# Patient Record
Sex: MALE | Race: WHITE | NOT HISPANIC OR LATINO | Employment: FULL TIME | URBAN - METROPOLITAN AREA
[De-identification: names, ages, dates, MRNs, and addresses within clinical notes are randomized per-mention and may not be internally consistent; named-entity substitution may affect disease eponyms.]

---

## 2023-08-08 PROBLEM — M25.50 POLYARTHRALGIA: Status: ACTIVE | Noted: 2023-08-08

## 2023-08-08 PROBLEM — J30.2 SEASONAL ALLERGIES: Status: ACTIVE | Noted: 2023-08-08

## 2023-08-08 PROBLEM — R53.83 FATIGUE: Status: ACTIVE | Noted: 2023-08-08

## 2023-08-08 PROBLEM — H92.13 OTORRHEA OF BOTH EARS: Status: ACTIVE | Noted: 2023-08-08

## 2023-08-08 PROBLEM — R12 HEARTBURN: Status: ACTIVE | Noted: 2023-08-08

## 2023-08-08 PROBLEM — E78.5 HLD (HYPERLIPIDEMIA): Status: ACTIVE | Noted: 2023-08-08

## 2023-08-08 PROBLEM — E55.9 VITAMIN D DEFICIENCY: Status: ACTIVE | Noted: 2023-08-08

## 2023-08-08 PROBLEM — R35.0 FREQUENT URINATION: Status: ACTIVE | Noted: 2023-08-08

## 2023-08-08 PROBLEM — R73.09 ELEVATED GLUCOSE: Status: ACTIVE | Noted: 2023-08-08

## 2023-08-08 RX ORDER — HYDROGEN PEROXIDE 3 %
SOLUTION, NON-ORAL MISCELLANEOUS
COMMUNITY
Start: 2019-09-12 | End: 2023-08-09 | Stop reason: ALTCHOICE

## 2023-08-08 NOTE — PROGRESS NOTES
"Subjective   Patient ID: Junior Parmar is a 53 y.o. male who presents for Back Pain and Annual Exam.    HPI    Patient presents today for annual exam and HLD follow up.  He does try to stick to low sodium, low cholesterol, low sugar diet.  Does/not exercise. Denies SOB or chest pain. Denies any bowel or urinary issues. No new family history of heart disease or cancer. Last eye exam within the last 2 years. Last dental exam 1 month ago.     Also presents in office today for back pain. Ongoing since January. Pain is 4-5/10. Denies any injury. OTC tylenol, aspirin, naproxen, Advil. Has also tried heating pad, cold compress, exercise, 10s unit. He has no relief from anything. Denies any injury. Admits to massages. Pain is always there but intensity comes and goes. He drives for [employer]. Has never had prescription medications for this. Has gone to a chiropractor before.    Taking current medications which were reviewed.  Problem list discussed.    Overall doing well.  Eating okay.  Staying active.    Has no other new problem /question.      ROS  Constitutional- No activity change. No appetite change.  Eyes- Denies vision changes.  Respiratory- No shortness of breath.  Cardiovascular- No palpitations. No chest pain.  GI- No nausea or vomiting. No diarrhea or constipation. Denies abdominal pain.  Musculoskeletal- Denies joint swelling.  Extremities- No edema.  Neurological- Denies headaches. Denies dizziness.  Skin- No rashes.  Psychiatric/Behavioral- Denies significant anxiety, or depressed mood.     Objective     /80   Pulse 78   Temp 36.6 °C (97.8 °F) (Temporal)   Resp 17   Ht 1.778 m (5' 10\")   Wt 76.3 kg (168 lb 3.2 oz)   SpO2 98%   BMI 24.13 kg/m²     No Known Allergies    Constitutional-- Well-nourished.  No distress  Head- unremarkable.  Ears- TMs clear.  Eyes- PERRL.  Conjunctiva normal.  Nose- Normal.  No rhinorrhea noted.  Throat- Oropharynx is clear and moist.  Neck- Supple with no thyromegaly.  No " significant cervical adenopathy noted.  Pulmonary/Chest- Breath sounds normal with normal effort.  No wheezing.  Heart- Regular rate and rhythm.  No murmur.  Abdomen- Soft and non-tender.  No masses noted.  Musculoskeletal- Normal ROM.  No significant joint swelling.  Pain symptoms localized to the mid thoracic area between the shoulder blades.  Kyphosis noted.  No point tenderness bruising swelling or rashes noted  Extremities- No edema.   Neurological- Alert.  No noted deficits.  Skin- Warm.  No rashes.  Psychiatric/Behavioral- Mood and affect normal.  Behavior normal.       Assessment/Plan   1. Annual physical exam        2. Mixed hyperlipidemia        3. Type 2 diabetes mellitus without complication, without long-term current use of insulin (CMS/[facility])        4. Screening PSA (prostate specific antigen)        5. Chronic midline thoracic back pain           Body mass index is 24.13 kg/m².     Long talk. Treatment options reviewed.    Continue and take your medications as prescribed.  Suspect back pain is musculoskeletal in nature.  He has a degree of kyphosis.  Medrol dosepak prescribed for inflammation in upper back.  When steroid is completed start Voltaren.    Health Maintenance issues discussed.    Importance of healthy diet and regular exercise regimen discussed.    We will contact you with any test results ordered. If you do not hear from us, please contact.  Obtain CBC, CMP, Lipid, PSA, A1C  Obtain x-rays of thoracic spine.    If test are normal and medications are not helping, we would recommend an MRI as further testing.    Follow-up as instructed or sooner if any problems or symptoms do not resolve as expected.    Scribe Attestation  By signing my name below, I, Kierra Wells MA, Scribe   attest that this documentation has been prepared under the direction and in the presence of Saw Vincent MD.

## 2023-08-09 ENCOUNTER — OFFICE VISIT (OUTPATIENT)
Dept: PRIMARY CARE | Facility: CLINIC | Age: 53
End: 2023-08-09
Payer: COMMERCIAL

## 2023-08-09 ENCOUNTER — LAB (OUTPATIENT)
Dept: LAB | Facility: LAB | Age: 53
End: 2023-08-09
Payer: COMMERCIAL

## 2023-08-09 VITALS
HEART RATE: 78 BPM | DIASTOLIC BLOOD PRESSURE: 80 MMHG | OXYGEN SATURATION: 98 % | RESPIRATION RATE: 17 BRPM | WEIGHT: 168.2 LBS | BODY MASS INDEX: 24.08 KG/M2 | SYSTOLIC BLOOD PRESSURE: 120 MMHG | HEIGHT: 70 IN | TEMPERATURE: 97.8 F

## 2023-08-09 DIAGNOSIS — E11.9 TYPE 2 DIABETES MELLITUS WITHOUT COMPLICATION, WITHOUT LONG-TERM CURRENT USE OF INSULIN (MULTI): ICD-10-CM

## 2023-08-09 DIAGNOSIS — M54.6 CHRONIC MIDLINE THORACIC BACK PAIN: ICD-10-CM

## 2023-08-09 DIAGNOSIS — Z00.00 ANNUAL PHYSICAL EXAM: Primary | ICD-10-CM

## 2023-08-09 DIAGNOSIS — G89.29 CHRONIC MIDLINE THORACIC BACK PAIN: ICD-10-CM

## 2023-08-09 DIAGNOSIS — E78.2 MIXED HYPERLIPIDEMIA: ICD-10-CM

## 2023-08-09 DIAGNOSIS — Z12.5 SCREENING PSA (PROSTATE SPECIFIC ANTIGEN): ICD-10-CM

## 2023-08-09 LAB
ALANINE AMINOTRANSFERASE (SGPT) (U/L) IN SER/PLAS: 15 U/L (ref 10–52)
ALBUMIN (G/DL) IN SER/PLAS: 4.7 G/DL (ref 3.4–5)
ALKALINE PHOSPHATASE (U/L) IN SER/PLAS: 50 U/L (ref 33–120)
ANION GAP IN SER/PLAS: 11 MMOL/L (ref 10–20)
ASPARTATE AMINOTRANSFERASE (SGOT) (U/L) IN SER/PLAS: 22 U/L (ref 9–39)
BASOPHILS (10*3/UL) IN BLOOD BY AUTOMATED COUNT: 0.04 X10E9/L (ref 0–0.1)
BASOPHILS/100 LEUKOCYTES IN BLOOD BY AUTOMATED COUNT: 0.5 % (ref 0–2)
BILIRUBIN TOTAL (MG/DL) IN SER/PLAS: 0.6 MG/DL (ref 0–1.2)
CALCIUM (MG/DL) IN SER/PLAS: 9.5 MG/DL (ref 8.6–10.3)
CARBON DIOXIDE, TOTAL (MMOL/L) IN SER/PLAS: 28 MMOL/L (ref 21–32)
CHLORIDE (MMOL/L) IN SER/PLAS: 106 MMOL/L (ref 98–107)
CHOLESTEROL (MG/DL) IN SER/PLAS: 255 MG/DL (ref 0–199)
CHOLESTEROL IN HDL (MG/DL) IN SER/PLAS: 59.6 MG/DL
CHOLESTEROL/HDL RATIO: 4.3
CREATININE (MG/DL) IN SER/PLAS: 0.88 MG/DL (ref 0.5–1.3)
EOSINOPHILS (10*3/UL) IN BLOOD BY AUTOMATED COUNT: 0.5 X10E9/L (ref 0–0.7)
EOSINOPHILS/100 LEUKOCYTES IN BLOOD BY AUTOMATED COUNT: 6.4 % (ref 0–6)
ERYTHROCYTE DISTRIBUTION WIDTH (RATIO) BY AUTOMATED COUNT: 12.5 % (ref 11.5–14.5)
ERYTHROCYTE MEAN CORPUSCULAR HEMOGLOBIN CONCENTRATION (G/DL) BY AUTOMATED: 33.4 G/DL (ref 32–36)
ERYTHROCYTE MEAN CORPUSCULAR VOLUME (FL) BY AUTOMATED COUNT: 92 FL (ref 80–100)
ERYTHROCYTES (10*6/UL) IN BLOOD BY AUTOMATED COUNT: 5.08 X10E12/L (ref 4.5–5.9)
ESTIMATED AVERAGE GLUCOSE FOR HBA1C: 123 MG/DL
GFR MALE: >90 ML/MIN/1.73M2
GLUCOSE (MG/DL) IN SER/PLAS: 122 MG/DL (ref 74–99)
HEMATOCRIT (%) IN BLOOD BY AUTOMATED COUNT: 46.7 % (ref 41–52)
HEMOGLOBIN (G/DL) IN BLOOD: 15.6 G/DL (ref 13.5–17.5)
HEMOGLOBIN A1C/HEMOGLOBIN TOTAL IN BLOOD: 5.9 %
IMMATURE GRANULOCYTES/100 LEUKOCYTES IN BLOOD BY AUTOMATED COUNT: 0.4 % (ref 0–0.9)
LDL: 163 MG/DL (ref 0–99)
LEUKOCYTES (10*3/UL) IN BLOOD BY AUTOMATED COUNT: 7.8 X10E9/L (ref 4.4–11.3)
LYMPHOCYTES (10*3/UL) IN BLOOD BY AUTOMATED COUNT: 1.75 X10E9/L (ref 1.2–4.8)
LYMPHOCYTES/100 LEUKOCYTES IN BLOOD BY AUTOMATED COUNT: 22.3 % (ref 13–44)
MONOCYTES (10*3/UL) IN BLOOD BY AUTOMATED COUNT: 0.58 X10E9/L (ref 0.1–1)
MONOCYTES/100 LEUKOCYTES IN BLOOD BY AUTOMATED COUNT: 7.4 % (ref 2–10)
NEUTROPHILS (10*3/UL) IN BLOOD BY AUTOMATED COUNT: 4.94 X10E9/L (ref 1.2–7.7)
NEUTROPHILS/100 LEUKOCYTES IN BLOOD BY AUTOMATED COUNT: 63 % (ref 40–80)
PLATELETS (10*3/UL) IN BLOOD AUTOMATED COUNT: 194 X10E9/L (ref 150–450)
POTASSIUM (MMOL/L) IN SER/PLAS: 4.4 MMOL/L (ref 3.5–5.3)
PROSTATE SPECIFIC ANTIGEN,SCREEN: 0.73 NG/ML (ref 0–4)
PROTEIN TOTAL: 7.5 G/DL (ref 6.4–8.2)
SODIUM (MMOL/L) IN SER/PLAS: 141 MMOL/L (ref 136–145)
TRIGLYCERIDE (MG/DL) IN SER/PLAS: 162 MG/DL (ref 0–149)
UREA NITROGEN (MG/DL) IN SER/PLAS: 21 MG/DL (ref 6–23)
VLDL: 32 MG/DL (ref 0–40)

## 2023-08-09 PROCEDURE — 85025 COMPLETE CBC W/AUTO DIFF WBC: CPT

## 2023-08-09 PROCEDURE — 80061 LIPID PANEL: CPT

## 2023-08-09 PROCEDURE — 83036 HEMOGLOBIN GLYCOSYLATED A1C: CPT

## 2023-08-09 PROCEDURE — 3074F SYST BP LT 130 MM HG: CPT | Performed by: FAMILY MEDICINE

## 2023-08-09 PROCEDURE — 99396 PREV VISIT EST AGE 40-64: CPT | Performed by: FAMILY MEDICINE

## 2023-08-09 PROCEDURE — 80053 COMPREHEN METABOLIC PANEL: CPT

## 2023-08-09 PROCEDURE — 84153 ASSAY OF PSA TOTAL: CPT

## 2023-08-09 PROCEDURE — 1036F TOBACCO NON-USER: CPT | Performed by: FAMILY MEDICINE

## 2023-08-09 PROCEDURE — 3079F DIAST BP 80-89 MM HG: CPT | Performed by: FAMILY MEDICINE

## 2023-08-09 PROCEDURE — 36415 COLL VENOUS BLD VENIPUNCTURE: CPT

## 2023-08-09 RX ORDER — METHYLPREDNISOLONE 4 MG/1
TABLET ORAL
Qty: 21 TABLET | Refills: 0 | Status: SHIPPED | OUTPATIENT
Start: 2023-08-09 | End: 2023-08-16

## 2023-08-09 RX ORDER — DICLOFENAC SODIUM 75 MG/1
75 TABLET, DELAYED RELEASE ORAL 2 TIMES DAILY PRN
Qty: 60 TABLET | Refills: 2 | Status: SHIPPED | OUTPATIENT
Start: 2023-08-09

## 2023-08-09 ASSESSMENT — ENCOUNTER SYMPTOMS: BACK PAIN: 1

## 2023-08-10 ENCOUNTER — TELEPHONE (OUTPATIENT)
Dept: PRIMARY CARE | Facility: CLINIC | Age: 53
End: 2023-08-10
Payer: COMMERCIAL

## 2023-08-10 DIAGNOSIS — R33.8 BENIGN PROSTATIC HYPERPLASIA WITH URINARY RETENTION: ICD-10-CM

## 2023-08-10 DIAGNOSIS — N40.1 BENIGN PROSTATIC HYPERPLASIA WITH URINARY RETENTION: ICD-10-CM

## 2023-08-10 RX ORDER — TAMSULOSIN HYDROCHLORIDE 0.4 MG/1
0.4 CAPSULE ORAL DAILY
Qty: 30 CAPSULE | Refills: 3 | Status: SHIPPED | OUTPATIENT
Start: 2023-08-10

## 2023-08-10 NOTE — TELEPHONE ENCOUNTER
Patient does have frequency, or trouble urinating. He would like to start a medication for BPH. (Flomax pending if this is what you want to prescribe). He is aware to check with the pharmacy tomorrow.

## 2023-08-10 NOTE — TELEPHONE ENCOUNTER
----- Message from Saw Vincent MD sent at 8/10/2023 10:04 AM EDT -----  Labs are within normal limits or stable with your sugar being under better control but your cholesterol is higher than last time.  A1c was 5.9 which is in the prediabetes range and much better than last time.  Continue healthy low sugar diet and especially improve your low-cholesterol diet.  We could change to a cholesterol lowering pill at a low dose as an option as well.  Follow-up per routine.  Please let him know

## 2023-08-10 NOTE — TELEPHONE ENCOUNTER
Pt called back and is aware. He did have a question about his prostate that he states he's been having issues with. He forgot to ask about it at yesterday's appointment. He is aware that you didn't mention anything abnormal about it but he was looking online and noticed it was a little high. He wanted to know if everything was ok with his prostate?  Please advise  Thanks      Pt's # [phone]    Aware you are out of the office today

## 2023-09-20 ENCOUNTER — OFFICE VISIT (OUTPATIENT)
Dept: PRIMARY CARE | Facility: CLINIC | Age: 53
End: 2023-09-20
Payer: COMMERCIAL

## 2023-09-20 VITALS
OXYGEN SATURATION: 97 % | SYSTOLIC BLOOD PRESSURE: 120 MMHG | RESPIRATION RATE: 18 BRPM | HEART RATE: 73 BPM | DIASTOLIC BLOOD PRESSURE: 80 MMHG | HEIGHT: 70 IN | WEIGHT: 170.4 LBS | BODY MASS INDEX: 24.39 KG/M2 | TEMPERATURE: 97.9 F

## 2023-09-20 DIAGNOSIS — R12 HEARTBURN: ICD-10-CM

## 2023-09-20 DIAGNOSIS — E11.9 TYPE 2 DIABETES MELLITUS WITHOUT COMPLICATION, WITHOUT LONG-TERM CURRENT USE OF INSULIN (MULTI): ICD-10-CM

## 2023-09-20 DIAGNOSIS — E78.2 MIXED HYPERLIPIDEMIA: ICD-10-CM

## 2023-09-20 DIAGNOSIS — M54.6 CHRONIC MIDLINE THORACIC BACK PAIN: Primary | ICD-10-CM

## 2023-09-20 DIAGNOSIS — G89.29 CHRONIC MIDLINE THORACIC BACK PAIN: Primary | ICD-10-CM

## 2023-09-20 PROCEDURE — 1036F TOBACCO NON-USER: CPT | Performed by: FAMILY MEDICINE

## 2023-09-20 PROCEDURE — 3074F SYST BP LT 130 MM HG: CPT | Performed by: FAMILY MEDICINE

## 2023-09-20 PROCEDURE — 99213 OFFICE O/P EST LOW 20 MIN: CPT | Performed by: FAMILY MEDICINE

## 2023-09-20 PROCEDURE — 3044F HG A1C LEVEL LT 7.0%: CPT | Performed by: FAMILY MEDICINE

## 2023-09-20 PROCEDURE — 3079F DIAST BP 80-89 MM HG: CPT | Performed by: FAMILY MEDICINE

## 2023-09-20 ASSESSMENT — ENCOUNTER SYMPTOMS: BACK PAIN: 1

## 2023-09-20 ASSESSMENT — PATIENT HEALTH QUESTIONNAIRE - PHQ9
2. FEELING DOWN, DEPRESSED OR HOPELESS: NOT AT ALL
1. LITTLE INTEREST OR PLEASURE IN DOING THINGS: NOT AT ALL
SUM OF ALL RESPONSES TO PHQ9 QUESTIONS 1 AND 2: 0

## 2023-09-20 NOTE — PROGRESS NOTES
"Subjective   Patient ID: Junior Parmar is a 53 y.o. male who presents for Back Pain.  HPI    Patient presents in office today for a follow up on back pain. Ongoing since January. Pain is 7-8/10. OTC tylenol, aspirin, naproxen, Advil. Has also tried heating pad, cold compress, exercise, 10s unit. He has no relief from anything. Denies any injury. Patient was prescribed diclofenac on 8/9/23. This medication did kind of help.     Declines flu vaccine at this time     Taking current medications which were reviewed.  Problem list discussed.    Overall doing well.  Eating okay.  Staying active.    Has no other new problem /question.     ROS  Constitutional- No activity change. No appetite change.  Eyes- Denies vision changes.  Respiratory- No shortness of breath.  Cardiovascular- No palpitations. No chest pain.  GI- No nausea or vomiting. No diarrhea or constipation. Denies abdominal pain.  Musculoskeletal- Denies joint swelling.  Extremities- No edema.  Neurological- Denies headaches. Denies dizziness.  Skin- No rashes.  Psychiatric/Behavioral- Denies significant anxiety, or depressed mood.     Objective     /80   Pulse 73   Temp 36.6 °C (97.9 °F) (Temporal)   Resp 18   Ht 1.778 m (5' 10\")   Wt 77.3 kg (170 lb 6.4 oz)   SpO2 97%   BMI 24.45 kg/m²     No Known Allergies    Constitutional-- Well-nourished.  No distress  Head- unremarkable.  Eyes- PERRL.  Conjunctiva normal.  Nose- Normal.  No rhinorrhea noted.  Throat- Oropharynx is clear and moist.  Neck- Supple with no thyromegaly.  No significant cervical adenopathy noted.  Pulmonary/Chest- Breath sounds normal with normal effort.  No wheezing.  Heart- Regular rate and rhythm.  No murmur.  Abdomen- Soft and non-tender.  No masses noted.  Musculoskeletal- Normal ROM.  No significant joint swelling.  Chronic thoracic midline back pain between the shoulder blades.  No point tenderness.  Extremities- No edema.   Neurological- Alert.  No noted " deficits.  Psychiatric/Behavioral- Mood and affect normal.     Assessment/Plan   1. Chronic midline thoracic back pain  Referral to Physical Therapy      2. Type 2 diabetes mellitus without complication, without long-term current use of insulin (CMS/[facility])        3. Mixed hyperlipidemia        4. Heartburn               Long talk. Treatment options reviewed.    Discussed back pain.  Will get x-ray which she has not yet done.  Advised patient to follow up with physical therapy.  Referral placed 9/20/2023.    Diabetes Mellitus controlled.  Educated on diabetes, low-calorie diet and exercise. Recommended eye exam once a year. Educated on diabetic foot care.    Continue and take your medications as prescribed.    Health Maintenance issues discussed.    Importance of healthy diet and regular exercise regimen discussed.    We will contact you with any test results ordered. If you do not hear from us, please contact.    Further work-up and treatment depending on how he does and test results  Follow-up as instructed or sooner if any problems or symptoms do not resolve as expected.    Scribe Attestation  By signing my name below, Mily HALL Scribe   attest that this documentation has been prepared under the direction and in the presence of Saw Vincent MD.

## 2023-10-10 ENCOUNTER — ANCILLARY PROCEDURE (OUTPATIENT)
Dept: RADIOLOGY | Facility: CLINIC | Age: 53
End: 2023-10-10
Payer: COMMERCIAL

## 2023-10-10 DIAGNOSIS — M54.6 CHRONIC MIDLINE THORACIC BACK PAIN: ICD-10-CM

## 2023-10-10 DIAGNOSIS — G89.29 CHRONIC MIDLINE THORACIC BACK PAIN: ICD-10-CM

## 2023-10-10 PROCEDURE — 72072 X-RAY EXAM THORAC SPINE 3VWS: CPT | Mod: FY

## 2023-10-10 PROCEDURE — 72072 X-RAY EXAM THORAC SPINE 3VWS: CPT | Performed by: RADIOLOGY

## 2023-10-16 ENCOUNTER — TELEPHONE (OUTPATIENT)
Dept: PRIMARY CARE | Facility: CLINIC | Age: 53
End: 2023-10-16
Payer: COMMERCIAL

## 2023-10-16 NOTE — TELEPHONE ENCOUNTER
Patient is aware. He is set up for PT. He will do that, and call for Pain Management referral if no improvement.

## 2023-10-16 NOTE — TELEPHONE ENCOUNTER
----- Message from Saw Vincent MD sent at 10/15/2023  8:39 AM EDT -----  X-ray of your back shows mild to moderate arthritis changes.  No fractures.  Alignment is good.  If not stable or improving with medication and current treatment the next step may be referral to pain management.  Please let me know if needed

## 2023-10-23 NOTE — PROGRESS NOTES
Physical Therapy    Physical Therapy Evaluation    Patient Name: Junior Parmar  MRN: [id number]  Today's Date: 10/24/2023  Time Calculation  Start Time: 1100  Stop Time: 1144  Time Calculation (min): 44 min    Insurance:  [facility]  20% coinsurance  BMN visits   Visit: 1  Approved visits: 10    Assessment  52 y/o M presents to outpatient physical therapy with reports of thoracic pain d/t degenerative changes and postural dysfunction. The patient presents with the current impairments of pain, decreased strength, increased muscular tension, impaired postural control/awareness. These impairments currently limit their ability to perform most daily functions including lifting, sleeping, working, and bending. Due to the limitations listed above, the patient is currently at a decreased functional level compared to baseline, and they would benefit from skilled physical therapy to improve pain intensity, strength, postural control, and flexibility and facilitate a safe and efficient return to functional baseline. Patient's prognosis for improvement with therapy is good at this time.    PT Assessment Results: Decreased strength, Decreased range of motion, Pain  Rehab Prognosis: Good  Evaluation/Treatment Tolerance: Patient tolerated treatment well    Plan  Treatment/Interventions: Cryotherapy, Dry needling, Education/ Instruction, Electrical stimulation, Hot pack, Manual therapy, Mechanical traction, Neuromuscular re-education, Taping techniques, Self care/ home management, Therapeutic activities, Therapeutic exercises, Ultrasound, Vasopneumatic device  PT Plan: Skilled PT  PT Frequency:  (1-2x/week)  Duration: 9 visits  Onset Date: 09/20/23  Certification Period Start Date: 10/24/23  Certification Period End Date: 01/22/24  Rehab Potential: Good  Plan of Care Agreement: Patient    Complexity:  Low    Current Problem  1. Chronic midline thoracic back pain  Referral to Physical Therapy    Follow Up In Physical Therapy     "      Subjective   Patient reports that earlier this year he began to develop mid back pain that has been slowly getting worse. He notes that he has one spot on his spine that the pain is \"horrible,\" but the pain in the surrounding areas is also getting bad. Notes that pain is steady and he can't think of too many activities that specifically increase it. Pain wakes him approximately 4x/week. Denies radiating pain around the rib cage or down the arm, but does endorse intermittent tingling in the mid back. Has tried heat, ice, massage, and TENS without relief. Pain is 5/10 right now, 4/10 at best, 8/10 at worst.     General:  General  Reason for Referral: Mid back pain  Referred By: Dr. Saw Vincent  Past Medical History Relevant to Rehab: none  Precautions:  Precautions  STEADI Fall Risk Score (The score of 4 or more indicates an increased risk of falling): 0  Precautions Comment: no significant PMH relevant to PT  Pain:  Pain Assessment: 0-10  Pain Score: 5 - Moderate pain  Pain Type: Chronic pain  Pain Location: Back  Pain Orientation: Mid  Pain Descriptors: Burning, Aching, Sharp  Pain Frequency: Constant/continuous    Prior Function Per Pt/Caregiver Report:  Prior Function Comments: Independent with all ADLs, work duties, and recreation activities    Objective   Cervical AROM (*indicates pain):  Flexion 62  Extension 61  RSB 35  LSB 36  Rrot 62  Lrot 66    Shoulder AROM (*indicates pain)  WFL and pain free bilaterally     Shoulder Strength (*indicates pain):  Flexion R 4+/5, L 4+/5  Abd R 4+/5, L 4+/5  IR R 5/5, L 5/5  ER R 4+/5, L 4+/5    Serratus anterior R 4-/5, L 4/5  Middle trapezius R 4-/5, L 4/5  Rhomboid R 4-/5. L 4-/5  Lower trapezius R 4-/5, L 4-/5    Seated Posture: forward head, rounded shoulders  Dermatomes: intact  Palpation: TTP to spinous processes T3-T8, bilateral thoracic paraspinals, bilateral rhomobid/middle trapezius/lower trapezius, mild tenderness to bilateral levator scapulae  Special " tests: - sharp pursar, - transverse lig, - spurling  Observation: bilateral scapular winging R>L    XR thoracic spine 10/10/23: Mild-to-moderate multilevel midthoracic discogenic degenerative  disease.    Treatment:  Rows silverTT x15  Shoulder extension BTT x15  BRENDA GTB x15  HABD GTB x15  (12 min)    Outcome Measures:  Other Measures  Oswestry Disablity Index (SUNDEEP): 13; 26%     OP EDUCATION:  Education  Individual(s) Educated: Patient  Education Provided: Anatomy, Body Mechanics, Home Exercise Program, POC, Posture  Equipment: Thera-Band  Patient/Caregiver Demonstrated Understanding: yes  Plan of Care Discussed and Agreed Upon: yes  Patient Response to Education: Patient/Caregiver Verbalized Understanding of Information, Patient/Caregiver Performed Return Demonstration of Exercises/Activities, Patient/Caregiver Asked Appropriate Questions    Goals:  1. Patient will report and demonstrate independence with established HEP  2. Patient will report decrease in thoracic pain by 75% to improve ability to drive, dress, and sleep without restriction  3. Patient will demonstrate gross bilateral shoulder strength of >/= 4+/5 to improve ability to perform all lifting activities without restriction  4. Patient will maintain proper seated position and posture x10 minutes without cueing from therapist  5. Patient will demonstrate a decrease in Modified Oswestry Low Back Disability Index score by 6 pts to 7/50 to meet established MCID for the outcome measure (baseline 10/24/23 13/50)  6. Patient will report the ability to sleep through the night 4/7 nights per week uninterrupted by pain to improve overall function throughout the day  7. Patient will demonstrate an increase in periscapular strengthening including serratus anterior, lower trapezius, middle trapezius, and rhomboid strength to >/= 4/5 bilaterally to improve his ability to maintain proper posture without external cueing  8. Patient will demonstrate the ability to  perform slow concentric and eccentric bilateral abduction of the shoulder with improved scapulothoracic rhythm demonstrated by minimal to no scapular winging bilaterally

## 2023-10-24 ENCOUNTER — EVALUATION (OUTPATIENT)
Dept: PHYSICAL THERAPY | Facility: CLINIC | Age: 53
End: 2023-10-24
Payer: COMMERCIAL

## 2023-10-24 DIAGNOSIS — M54.6 CHRONIC MIDLINE THORACIC BACK PAIN: Primary | ICD-10-CM

## 2023-10-24 DIAGNOSIS — G89.29 CHRONIC MIDLINE THORACIC BACK PAIN: Primary | ICD-10-CM

## 2023-10-24 PROCEDURE — 97110 THERAPEUTIC EXERCISES: CPT | Mod: GP | Performed by: PHYSICAL THERAPIST

## 2023-10-24 PROCEDURE — 97161 PT EVAL LOW COMPLEX 20 MIN: CPT | Mod: GP | Performed by: PHYSICAL THERAPIST

## 2023-10-24 ASSESSMENT — PAIN - FUNCTIONAL ASSESSMENT: PAIN_FUNCTIONAL_ASSESSMENT: 0-10

## 2023-10-24 ASSESSMENT — PAIN SCALES - GENERAL: PAINLEVEL_OUTOF10: 5 - MODERATE PAIN

## 2023-10-24 ASSESSMENT — PAIN DESCRIPTION - DESCRIPTORS: DESCRIPTORS: BURNING;ACHING;SHARP

## 2023-10-24 NOTE — Clinical Note
October 24, 2023     Patient: Junior Parmar   YOB: 1970   Date of Visit: 10/24/2023       To Whom it May Concern:    Junior Parmar was seen in my clinic on 10/24/2023. He {Return to school/sport:73530}.    If you have any questions or concerns, please don't hesitate to call.         Sincerely,          Ethan Collado, PT        CC: No Recipients

## 2023-10-24 NOTE — Clinical Note
October 24, 2023     Patient: Junior Parmar   YOB: 1970   Date of Visit: 10/24/2023       To Whom It May Concern:    It is my medical opinion that Junior Parmar {Work release (duty restriction):42291}.    If you have any questions or concerns, please don't hesitate to call.         Sincerely,        Ethan Collado, PT    CC: No Recipients

## 2023-10-25 RX ORDER — HYDROGEN PEROXIDE 3 %
20 SOLUTION, NON-ORAL MISCELLANEOUS
COMMUNITY

## 2023-10-26 ENCOUNTER — TREATMENT (OUTPATIENT)
Dept: PHYSICAL THERAPY | Facility: CLINIC | Age: 53
End: 2023-10-26
Payer: COMMERCIAL

## 2023-10-26 DIAGNOSIS — M54.6 CHRONIC MIDLINE THORACIC BACK PAIN: Primary | ICD-10-CM

## 2023-10-26 DIAGNOSIS — G89.29 CHRONIC MIDLINE THORACIC BACK PAIN: Primary | ICD-10-CM

## 2023-10-26 PROCEDURE — 97110 THERAPEUTIC EXERCISES: CPT | Mod: GP | Performed by: PHYSICAL THERAPIST

## 2023-10-26 ASSESSMENT — PAIN - FUNCTIONAL ASSESSMENT: PAIN_FUNCTIONAL_ASSESSMENT: 0-10

## 2023-10-26 ASSESSMENT — PAIN SCALES - GENERAL: PAINLEVEL_OUTOF10: 5 - MODERATE PAIN

## 2023-10-26 NOTE — PATIENT INSTRUCTIONS
Access Code: [email or web address]  URL: [email or web address]  Date: 10/26/2023  Prepared by: Ethan Collado    Exercises  - Gentle Levator Scapulae Stretch  - 2 x daily - 7 x weekly - 1 sets - 3 reps - 30sec hold  - Doorway Rhomboid Stretch  - 2 x daily - 7 x weekly - 1 sets - 10 reps - 5 sec hold  - Shoulder Adduction with Anchored Resistance  - 1 x daily - 7 x weekly - 2 sets - 10 reps  - Shoulder Flexion Serratus Activation with Resistance  - 1 x daily - 7 x weekly - 1-2 sets - 10 reps  - Standing Low Trap Setting with Resistance at Wall  - 1 x daily - 7 x weekly - 1-2 sets - 10 reps - 3 sec hold

## 2023-10-26 NOTE — PROGRESS NOTES
Physical Therapy    Physical Therapy Treatment    Patient Name: Junior Parmar  MRN: [id number]  Today's Date: 10/26/2023  Time Calculation  Start Time: 0800  Stop Time: 0845  Time Calculation (min): 45 min  [facility]  20% coinsurance  N visits   Visit: 2  Approved visits: 10    Assessment:  Therapeutic exercises performed this date targeting improving postural strength and awareness to reduce stress applied on the thoracic spine throughout the day. Patient tolerates all treatment well, without reports of increased pain. Scapular winging is noted during serratus anterior strengthening, and patient reports increased fatigue following strengthening for this muscle. HEP updated this date for continued progression while not in PT sessions. He remains limited at this time due to his current impairments, and he would benefit from additional skilled PT.  PT Assessment  PT Assessment Results: Decreased strength, Decreased range of motion, Pain  Rehab Prognosis: Good  Evaluation/Treatment Tolerance: Patient tolerated treatment well    Plan:  OP PT Plan  PT Plan: Skilled PT  Rehab Potential: Good  Continue to progress postural strengthening/awareness  Current Problem  1. Chronic midline thoracic back pain  Follow Up In Physical Therapy          Subjective   Patient reports that his mid back is feeling about the same at this time. He notes that he tried to contact his insurance company, and he thinks that PT may  be too expensive, so he doesn't know whether or not he will be moving forward with additional appointments.    Precautions  Precautions  STEADI Fall Risk Score (The score of 4 or more indicates an increased risk of falling): 0  Precautions Comment: no significant PMH relevant to PT  Pain  Pain Assessment: 0-10  Pain Score: 5 - Moderate pain  Pain Type: Chronic pain  Pain Location: Back  Pain Orientation: Mid    Objective   Scapular winging bilaterally during serratus strengthening    Treatments:  Therapeutic  "Exercise  Therapeutic Exercise Activity 1: UBE L3 2' fwd/2' bwd ALT  Therapeutic Exercise Activity 2: Rhomboid stretch at door 10x5\" B  Therapeutic Exercise Activity 3: LS stretch 2x30\" B  Therapeutic Exercise Activity 4: Rows silverTT 2x10  Therapeutic Exercise Activity 5: Shoulder ext blackTT 2x10  Therapeutic Exercise Activity 6: BRENDA GTB 2x10  Therapeutic Exercise Activity 7: HABD GTB 2x10  Therapeutic Exercise Activity 8: Shoulder adduction silverTT x15 B  Therapeutic Exercise Activity 9: Serratus flexion lift YTB x10  Therapeutic Exercise Activity 10: Standing Y with lift off YTB 15x3\"    OP EDUCATION:  Education  Individual(s) Educated: Patient  Education Provided: Home Exercise Program  Equipment: Thera-Band  HEP:  - Gentle Levator Scapulae Stretch  - 2 x daily - 7 x weekly - 1 sets - 3 reps - 30sec hold  - Doorway Rhomboid Stretch  - 2 x daily - 7 x weekly - 1 sets - 10 reps - 5 sec hold  - Shoulder Adduction with Anchored Resistance  - 1 x daily - 7 x weekly - 2 sets - 10 reps  - Shoulder Flexion Serratus Activation with Resistance  - 1 x daily - 7 x weekly - 1-2 sets - 10 reps  - Standing Low Trap Setting with Resistance at Wall  - 1 x daily - 7 x weekly - 1-2 sets - 10 reps - 3 sec hold    Goals:   1. Patient will report and demonstrate independence with established HEP  2. Patient will report decrease in thoracic pain by 75% to improve ability to drive, dress, and sleep without restriction  3. Patient will demonstrate gross bilateral shoulder strength of >/= 4+/5 to improve ability to perform all lifting activities without restriction  4. Patient will maintain proper seated position and posture x10 minutes without cueing from therapist  5. Patient will demonstrate a decrease in Modified Oswestry Low Back Disability Index score by 6 pts to 7/50 to meet established MCID for the outcome measure (baseline 10/24/23 13/50)  6. Patient will report the ability to sleep through the night 4/7 nights per week " uninterrupted by pain to improve overall function throughout the day  7. Patient will demonstrate an increase in periscapular strengthening including serratus anterior, lower trapezius, middle trapezius, and rhomboid strength to >/= 4/5 bilaterally to improve his ability to maintain proper posture without external cueing  8. Patient will demonstrate the ability to perform slow concentric and eccentric bilateral abduction of the shoulder with improved scapulothoracic rhythm demonstrated by minimal to no scapular winging bilaterally

## 2023-11-06 ENCOUNTER — TELEPHONE (OUTPATIENT)
Dept: PRIMARY CARE | Facility: CLINIC | Age: 53
End: 2023-11-06
Payer: COMMERCIAL

## 2023-11-06 DIAGNOSIS — G89.29 CHRONIC MIDLINE THORACIC BACK PAIN: Primary | ICD-10-CM

## 2023-11-06 DIAGNOSIS — M54.6 CHRONIC MIDLINE THORACIC BACK PAIN: Primary | ICD-10-CM

## 2023-11-06 NOTE — TELEPHONE ENCOUNTER
Lmom for patient that referral was faxed and they will call him to set up the appointment  [phone]

## 2023-11-06 NOTE — TELEPHONE ENCOUNTER
Patient is calling because he states you had told him to call back when he was ready to request a referral for pain management. He saw you last on 9/20/23 for chronic midline thoracic back pain. Ok for a pain management referral?  Please advise  Thanks    Patient's # [phone]

## 2024-02-14 ENCOUNTER — DOCUMENTATION (OUTPATIENT)
Dept: PHYSICAL THERAPY | Facility: CLINIC | Age: 54
End: 2024-02-14
Payer: COMMERCIAL

## 2024-02-14 NOTE — PROGRESS NOTES
Physical Therapy    Discharge Summary    Name: Junior Parmar  MRN: [id number]  : 1970  Date: 24    Discharge Summary: PT    Discharge Information: Date of discharge 24, Date of last visit 10/26/23, Date of evaluation 10/24/23, Number of attended visits 2, Referred by Dr. Saw Vincent, and Referred for Chronic midline  thoracic pain    Discharge Status: D/t the nature of the absent discharge, patient's current status is unknown at this time       Rehab Discharge Reason: Failed to schedule and/or keep follow-up appointment(s)

## 2024-12-16 ENCOUNTER — LAB (OUTPATIENT)
Dept: LAB | Facility: LAB | Age: 54
End: 2024-12-16
Payer: COMMERCIAL

## 2024-12-16 ENCOUNTER — APPOINTMENT (OUTPATIENT)
Dept: PRIMARY CARE | Facility: CLINIC | Age: 54
End: 2024-12-16
Payer: COMMERCIAL

## 2024-12-16 VITALS
DIASTOLIC BLOOD PRESSURE: 78 MMHG | BODY MASS INDEX: 25.86 KG/M2 | HEIGHT: 69 IN | TEMPERATURE: 96.2 F | OXYGEN SATURATION: 98 % | HEART RATE: 69 BPM | WEIGHT: 174.6 LBS | SYSTOLIC BLOOD PRESSURE: 124 MMHG

## 2024-12-16 DIAGNOSIS — M54.6 CHRONIC MIDLINE THORACIC BACK PAIN: ICD-10-CM

## 2024-12-16 DIAGNOSIS — E11.9 TYPE 2 DIABETES MELLITUS WITHOUT COMPLICATION, WITHOUT LONG-TERM CURRENT USE OF INSULIN (MULTI): ICD-10-CM

## 2024-12-16 DIAGNOSIS — G89.29 CHRONIC MIDLINE THORACIC BACK PAIN: ICD-10-CM

## 2024-12-16 DIAGNOSIS — Z12.5 SCREENING PSA (PROSTATE SPECIFIC ANTIGEN): ICD-10-CM

## 2024-12-16 DIAGNOSIS — E78.2 MIXED HYPERLIPIDEMIA: ICD-10-CM

## 2024-12-16 DIAGNOSIS — Z00.00 ANNUAL PHYSICAL EXAM: ICD-10-CM

## 2024-12-16 LAB
ALBUMIN SERPL BCP-MCNC: 4.4 G/DL (ref 3.4–5)
ALP SERPL-CCNC: 53 U/L (ref 33–120)
ALT SERPL W P-5'-P-CCNC: 15 U/L (ref 10–52)
ANION GAP SERPL CALC-SCNC: 8 MMOL/L (ref 10–20)
AST SERPL W P-5'-P-CCNC: 17 U/L (ref 9–39)
BASOPHILS # BLD AUTO: 0.04 X10*3/UL (ref 0–0.1)
BASOPHILS NFR BLD AUTO: 0.7 %
BILIRUB SERPL-MCNC: 0.6 MG/DL (ref 0–1.2)
BUN SERPL-MCNC: 17 MG/DL (ref 6–23)
CALCIUM SERPL-MCNC: 9.2 MG/DL (ref 8.6–10.3)
CHLORIDE SERPL-SCNC: 105 MMOL/L (ref 98–107)
CHOLEST SERPL-MCNC: 274 MG/DL (ref 0–199)
CHOLESTEROL/HDL RATIO: 4.3
CO2 SERPL-SCNC: 31 MMOL/L (ref 21–32)
CREAT SERPL-MCNC: 0.9 MG/DL (ref 0.5–1.3)
EGFRCR SERPLBLD CKD-EPI 2021: >90 ML/MIN/1.73M*2
EOSINOPHIL # BLD AUTO: 0.36 X10*3/UL (ref 0–0.7)
EOSINOPHIL NFR BLD AUTO: 6.3 %
ERYTHROCYTE [DISTWIDTH] IN BLOOD BY AUTOMATED COUNT: 12 % (ref 11.5–14.5)
EST. AVERAGE GLUCOSE BLD GHB EST-MCNC: 128 MG/DL
GLUCOSE SERPL-MCNC: 124 MG/DL (ref 74–99)
HBA1C MFR BLD: 6.1 %
HCT VFR BLD AUTO: 44.3 % (ref 41–52)
HDLC SERPL-MCNC: 63.1 MG/DL
HGB BLD-MCNC: 15.1 G/DL (ref 13.5–17.5)
IMM GRANULOCYTES # BLD AUTO: 0.02 X10*3/UL (ref 0–0.7)
IMM GRANULOCYTES NFR BLD AUTO: 0.4 % (ref 0–0.9)
LDLC SERPL CALC-MCNC: 170 MG/DL
LYMPHOCYTES # BLD AUTO: 1.83 X10*3/UL (ref 1.2–4.8)
LYMPHOCYTES NFR BLD AUTO: 32.2 %
MCH RBC QN AUTO: 30.8 PG (ref 26–34)
MCHC RBC AUTO-ENTMCNC: 34.1 G/DL (ref 32–36)
MCV RBC AUTO: 90 FL (ref 80–100)
MONOCYTES # BLD AUTO: 0.48 X10*3/UL (ref 0.1–1)
MONOCYTES NFR BLD AUTO: 8.4 %
NEUTROPHILS # BLD AUTO: 2.96 X10*3/UL (ref 1.2–7.7)
NEUTROPHILS NFR BLD AUTO: 52 %
NON HDL CHOLESTEROL: 211 MG/DL (ref 0–149)
NRBC BLD-RTO: 0 /100 WBCS (ref 0–0)
PLATELET # BLD AUTO: 175 X10*3/UL (ref 150–450)
POTASSIUM SERPL-SCNC: 4.4 MMOL/L (ref 3.5–5.3)
PROT SERPL-MCNC: 6.8 G/DL (ref 6.4–8.2)
PSA SERPL-MCNC: 1.01 NG/ML
RBC # BLD AUTO: 4.91 X10*6/UL (ref 4.5–5.9)
SODIUM SERPL-SCNC: 140 MMOL/L (ref 136–145)
TRIGL SERPL-MCNC: 206 MG/DL (ref 0–149)
VLDL: 41 MG/DL (ref 0–40)
WBC # BLD AUTO: 5.7 X10*3/UL (ref 4.4–11.3)

## 2024-12-16 PROCEDURE — 3044F HG A1C LEVEL LT 7.0%: CPT | Performed by: FAMILY MEDICINE

## 2024-12-16 PROCEDURE — 1036F TOBACCO NON-USER: CPT | Performed by: FAMILY MEDICINE

## 2024-12-16 PROCEDURE — 3050F LDL-C >= 130 MG/DL: CPT | Performed by: FAMILY MEDICINE

## 2024-12-16 PROCEDURE — 80053 COMPREHEN METABOLIC PANEL: CPT

## 2024-12-16 PROCEDURE — 84153 ASSAY OF PSA TOTAL: CPT

## 2024-12-16 PROCEDURE — 85025 COMPLETE CBC W/AUTO DIFF WBC: CPT

## 2024-12-16 PROCEDURE — 83036 HEMOGLOBIN GLYCOSYLATED A1C: CPT

## 2024-12-16 PROCEDURE — 3074F SYST BP LT 130 MM HG: CPT | Performed by: FAMILY MEDICINE

## 2024-12-16 PROCEDURE — 36415 COLL VENOUS BLD VENIPUNCTURE: CPT

## 2024-12-16 PROCEDURE — 80061 LIPID PANEL: CPT

## 2024-12-16 PROCEDURE — 3078F DIAST BP <80 MM HG: CPT | Performed by: FAMILY MEDICINE

## 2024-12-16 PROCEDURE — 99396 PREV VISIT EST AGE 40-64: CPT | Performed by: FAMILY MEDICINE

## 2024-12-16 PROCEDURE — 3008F BODY MASS INDEX DOCD: CPT | Performed by: FAMILY MEDICINE

## 2024-12-16 NOTE — PROGRESS NOTES
"5Subjective   Patient ID: Junior Parmar is a 54 y.o. male who presents for Annual Exam and Back Pain.  HPI    Patient presents today for a physical. Is Fasting for blood work. Tries to eat a generally healthy diet. Exercises yes .     Patient complaints of back pain. This is an ongoing issue since last year. Has tried Pt, medication, pain management, nothing seems to work . Pt would like to get a shot to relieve the pain.     Taking current medications which were reviewed.  Problem list discussed.    Overall doing well.  Eating okay.  Staying active.    Has no other new problem /question.     ROS  Constitutional- No activity change. No appetite change.  Eyes- Denies vision changes.  Respiratory- No shortness of breath.  Cardiovascular- No palpitations. No chest pain.  GI- No nausea or vomiting. No diarrhea or constipation. Denies abdominal pain.  Musculoskeletal- myalgias  Extremities- No edema.  Neurological- Denies headaches. Denies dizziness.  Skin- No rashes.  Psychiatric/Behavioral- Denies significant anxiety, or depressed mood.     Objective     /78 (BP Location: Right arm, Patient Position: Sitting, BP Cuff Size: Adult)   Pulse 69   Temp 35.7 °C (96.2 °F) (Temporal)   Ht 1.753 m (5' 9\")   Wt 79.2 kg (174 lb 9.6 oz)   SpO2 98%   BMI 25.78 kg/m²     No Known Allergies    Constitutional-- Well-nourished.  No distress  Head- unremarkable.  Eyes- PERRL.  Conjunctiva normal.  Nose- Normal.  No rhinorrhea noted.  Throat- Oropharynx is clear and moist.  Neck- Supple with no thyromegaly.  No significant cervical adenopathy noted.  Pulmonary/Chest- Breath sounds normal with normal effort.  No wheezing.  Heart- Regular rate and rhythm.  No murmur.  Abdomen- Soft and non-tender.  No masses noted.  Musculoskeletal-mild low back pain exacerbated with range of motion.  No significant spasm  Extremities- No edema.   Neurological- Alert.  No noted deficits.  Skin- Warm.  No rashes.  Psychiatric/Behavioral- Mood " and affect normal.  Behavior normal.     Assessment/Plan   1. Annual physical exam        2. Type 2 diabetes mellitus without complication, without long-term current use of insulin (Multi)  CBC and Auto Differential    Comprehensive Metabolic Panel    Hemoglobin A1C      3. Chronic midline thoracic back pain        4. Mixed hyperlipidemia  Lipid Panel      5. Screening PSA (prostate specific antigen)  Prostate Specific Antigen, Screen             Long talk. Treatment options reviewed.    Reviewed most recent lab work with patient. Advised patient to remain up to date on routine maintenance and health screening.      Discussed back pain.  Discussed Osteoarthritis controlled. Educated the patient on osteoarthritis care and management. Educated on muscle strength and exercise.    Discussed Diabetes Mellitus. Continue to monitor glucose levels. Educated on diabetes, low-calorie diet and exercise. Recommended eye exam once a year. Educated on diabetic foot care.    Discussed importance of natural sources of nutrition.  Advised patient to consume vegetables, salads, fruits, nuts, and proteins such as fish and chicken.  Discussed portion control.      Discussed the importance of routine stretching and exercise.     Continue and take your medications as prescribed.    Health Maintenance issues discussed.    Importance of healthy diet and regular exercise regimen discussed.    We will contact you with any test results ordered. If you do not hear from us, please contact.    Follow-up as instructed or sooner if any problems or symptoms do not resolve as expected.          Scribe Attestation  By signing my name below, IMily Scribe   attest that this documentation has been prepared under the direction and in the presence of Saw Vincent MD.

## 2024-12-23 ENCOUNTER — OFFICE VISIT (OUTPATIENT)
Dept: PRIMARY CARE | Facility: CLINIC | Age: 54
End: 2024-12-23
Payer: COMMERCIAL

## 2024-12-23 VITALS
BODY MASS INDEX: 25.92 KG/M2 | WEIGHT: 175 LBS | SYSTOLIC BLOOD PRESSURE: 146 MMHG | DIASTOLIC BLOOD PRESSURE: 80 MMHG | TEMPERATURE: 98 F | HEART RATE: 69 BPM | HEIGHT: 69 IN

## 2024-12-23 DIAGNOSIS — M99.08 SOMATIC DYSFUNCTION OF RIB CAGE REGION: ICD-10-CM

## 2024-12-23 DIAGNOSIS — M99.02 SOMATIC DYSFUNCTION OF THORACIC REGION: ICD-10-CM

## 2024-12-23 DIAGNOSIS — R07.81 RIB PAIN ON LEFT SIDE: ICD-10-CM

## 2024-12-23 DIAGNOSIS — E66.3 OVERWEIGHT (BMI 25.0-29.9): ICD-10-CM

## 2024-12-23 DIAGNOSIS — G89.29 CHRONIC MIDLINE THORACIC BACK PAIN: Primary | ICD-10-CM

## 2024-12-23 DIAGNOSIS — M99.07 SOMATIC DYSFUNCTION OF UPPER EXTREMITY: ICD-10-CM

## 2024-12-23 DIAGNOSIS — M54.6 CHRONIC MIDLINE THORACIC BACK PAIN: Primary | ICD-10-CM

## 2024-12-23 PROCEDURE — 3008F BODY MASS INDEX DOCD: CPT | Performed by: FAMILY MEDICINE

## 2024-12-23 PROCEDURE — 1036F TOBACCO NON-USER: CPT | Performed by: FAMILY MEDICINE

## 2024-12-23 PROCEDURE — 97110 THERAPEUTIC EXERCISES: CPT | Performed by: FAMILY MEDICINE

## 2024-12-23 PROCEDURE — 99214 OFFICE O/P EST MOD 30 MIN: CPT | Performed by: FAMILY MEDICINE

## 2024-12-23 PROCEDURE — 98926 OSTEOPATH MANJ 3-4 REGIONS: CPT | Performed by: FAMILY MEDICINE

## 2024-12-23 RX ORDER — PREDNISONE 20 MG/1
TABLET ORAL
Qty: 18 TABLET | Refills: 0 | Status: SHIPPED | OUTPATIENT
Start: 2024-12-23

## 2024-12-23 RX ORDER — METHOCARBAMOL 500 MG/1
500 TABLET, FILM COATED ORAL 4 TIMES DAILY PRN
Qty: 90 TABLET | Refills: 0 | Status: SHIPPED | OUTPATIENT
Start: 2024-12-23 | End: 2025-02-21

## 2024-12-23 ASSESSMENT — PATIENT HEALTH QUESTIONNAIRE - PHQ9
SUM OF ALL RESPONSES TO PHQ9 QUESTIONS 1 AND 2: 0
1. LITTLE INTEREST OR PLEASURE IN DOING THINGS: NOT AT ALL
2. FEELING DOWN, DEPRESSED OR HOPELESS: NOT AT ALL

## 2024-12-23 NOTE — PROGRESS NOTES
Patient is here to establish.  His regular PCP, he felt he brushed them off.  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  He said this off and on back pain and states that many years ago he had slipped disc in his upper back and he got an injection of steroid that completely alleviated the issue and would like this done again.  Last year he had had x-rays as well as also an MRI which were not able to see in the chart and gone to physical therapy but nothing really helped.  He also seen a chiropractor.  No injury or fall he can point to where it is in the left posterior back.  He got a pain management doctor but was only offered nerve medication and pain medication for which he cannot take because he is a .  All he wanted was some injections and was never offered that from what ever pain management doctor he had seen.    REVIEW OF SYSTEMS: 12 systems negative except for those mentioned in the HPI.    PHYSICAL EXAMINATION:   Constitutional: The patient is alert, in no acute  distress.  Eyes: Extraocular movements are intact.   ENT: external ear canals patent  Neck: no  JVD.  Heart: no JVD  Lungs: Normal respiration without stridor or nasal flaring   Psychiatric: Good judgment and insight. Normal affect and mood.  Skin: no rashes or lesions  MSK/neurological graders 2-12 is intact.  Patient has posterior rib #8 on the left with spasm in the paraspinal muscles and QL going all the way down to the out sign the left hip.  Patient also has poor anterior posturing with exacerbated kyphosis of his cervical spine.    Patient with oral consent for osteopathic manipulation.  HVLA as well as myofascial release and muscle energy is performed to the listed areas.  Patient had return to normal function, decreased tissue texture changes as well as no pain.  Patient was also given home instructions for seated rows as well as also strengthening of the abdominals and lower back.      Assessment:  per EMR    Plan:  OARRS reviewed  appropriate.  Discussed different options with the patient especially since he is prediabetic and did review his CBC CMP A1c lipid panel with the patient will go with the store steroid burst however in all honesty the patient really needs strengthening of the core and also working on his posture.  Also discussed using a lumbar insert for his driving.  He can also see chiropractor mainly for dry needling as well as also Dr. Erwin for injections including RFA ablation if necessary though in all honesty I think with a foam roller medication and strengthening his problems should hood will follow-up in 3 weeks    This dictation was created using Dragon dictation and may contain errors

## 2024-12-24 DIAGNOSIS — E78.2 MIXED HYPERLIPIDEMIA: ICD-10-CM

## 2024-12-24 RX ORDER — ROSUVASTATIN CALCIUM 5 MG/1
5 TABLET, COATED ORAL DAILY
COMMUNITY
End: 2024-12-24 | Stop reason: SDUPTHER

## 2024-12-24 RX ORDER — ROSUVASTATIN CALCIUM 5 MG/1
5 TABLET, COATED ORAL DAILY
Qty: 90 TABLET | Refills: 1 | Status: SHIPPED | OUTPATIENT
Start: 2024-12-24

## 2024-12-24 NOTE — TELEPHONE ENCOUNTER
----- Message from Saw Vincent sent at 12/24/2024  8:21 AM EST -----  Labs are within normal limits or stable except for elevated blood sugar and cholesterol.  A1c is 6.1 indicating very good sugar control.  Cholesterol numbers significantly elevated from last year.  Recommend starting Crestor 5 mg daily 30 pills with 5 refills.  Repeat lipid profile fasting 1 month after starting cholesterol medication.  Follow-up with me in 5 to 6 months.  Please let him know and arrange

## 2024-12-24 NOTE — TELEPHONE ENCOUNTER
Pt is aware of lab result and changes   Pt will recheck labs 1 month after starting medication.  Pt will call back to make appointment for 5 to 6 month check up

## 2024-12-26 ENCOUNTER — TELEPHONE (OUTPATIENT)
Dept: PAIN MEDICINE | Facility: CLINIC | Age: 54
End: 2024-12-26
Payer: COMMERCIAL

## 2025-01-06 ENCOUNTER — APPOINTMENT (OUTPATIENT)
Dept: PRIMARY CARE | Facility: CLINIC | Age: 55
End: 2025-01-06
Payer: COMMERCIAL

## 2025-01-13 ENCOUNTER — APPOINTMENT (OUTPATIENT)
Dept: PRIMARY CARE | Facility: CLINIC | Age: 55
End: 2025-01-13
Payer: COMMERCIAL

## 2025-01-13 VITALS
DIASTOLIC BLOOD PRESSURE: 98 MMHG | WEIGHT: 176 LBS | SYSTOLIC BLOOD PRESSURE: 158 MMHG | HEART RATE: 71 BPM | HEIGHT: 69 IN | TEMPERATURE: 97.7 F | BODY MASS INDEX: 26.07 KG/M2

## 2025-01-13 DIAGNOSIS — G89.29 CHRONIC MIDLINE THORACIC BACK PAIN: ICD-10-CM

## 2025-01-13 DIAGNOSIS — M54.6 CHRONIC MIDLINE THORACIC BACK PAIN: ICD-10-CM

## 2025-01-13 DIAGNOSIS — E66.3 OVERWEIGHT (BMI 25.0-29.9): ICD-10-CM

## 2025-01-13 DIAGNOSIS — E55.9 VITAMIN D DEFICIENCY: ICD-10-CM

## 2025-01-13 DIAGNOSIS — R07.81 RIB PAIN ON LEFT SIDE: Primary | ICD-10-CM

## 2025-01-13 PROCEDURE — 3008F BODY MASS INDEX DOCD: CPT | Performed by: FAMILY MEDICINE

## 2025-01-13 PROCEDURE — 99213 OFFICE O/P EST LOW 20 MIN: CPT | Performed by: FAMILY MEDICINE

## 2025-01-13 PROCEDURE — 1036F TOBACCO NON-USER: CPT | Performed by: FAMILY MEDICINE

## 2025-01-13 RX ORDER — CELECOXIB 200 MG/1
200 CAPSULE ORAL 2 TIMES DAILY
Qty: 60 CAPSULE | Refills: 5 | Status: SHIPPED | OUTPATIENT
Start: 2025-01-13 | End: 2025-07-12

## 2025-01-13 RX ORDER — CELECOXIB 200 MG/1
200 CAPSULE ORAL 2 TIMES DAILY
Qty: 60 CAPSULE | Refills: 5 | Status: SHIPPED | OUTPATIENT
Start: 2025-01-13 | End: 2025-01-13 | Stop reason: SDUPTHER

## 2025-01-13 ASSESSMENT — PATIENT HEALTH QUESTIONNAIRE - PHQ9
2. FEELING DOWN, DEPRESSED OR HOPELESS: NOT AT ALL
SUM OF ALL RESPONSES TO PHQ9 QUESTIONS 1 AND 2: 0
1. LITTLE INTEREST OR PLEASURE IN DOING THINGS: NOT AT ALL

## 2025-01-13 NOTE — PROGRESS NOTES
Patient is here 3-week follow-up.  He felt good after the adjustment last time has been seeing a chiropractor which helps for about a day.  Even on the weekends he makes about half a day before he starts having burning and tingling pain.  I do not schedule up with pain management.  Muscle laxer's really did not do too much for him.  He is concerned about his.and medications that would affect his driving.    REVIEW OF SYSTEMS: 12 systems negative except for those mentioned in the HPI.    PHYSICAL EXAMINATION:   Constitutional: The patient is alert, in no acute  distress.  Eyes: Extraocular movements are intact.   ENT: external ear canals patent  Neck: no  JVD.  Heart: no JVD  Lungs: Normal respiration without stridor or nasal flaring   Psychiatric: Good judgment and insight. Normal affect and mood.  Skin: no rashes or lesions    Assessment:  per EMR    Plan:  Will switch over to try Celebrex.  I discussed with patient also going to Dr. Reno for RFA ablation which would likely take care of had not have any risk or concerns for medication usage.  He also is would be able to drive still with gabapentin which his body is used to it but he would prefer not medications and to try this first which I agree.  Will also try Celebrex to see if that would always give him temporary relief and second CP management.  Also discussed traction and inversion table at home with what might likely also give him relief    This dictation was created using Dragon dictation and may contain errors

## 2025-01-31 ENCOUNTER — OFFICE VISIT (OUTPATIENT)
Dept: PAIN MEDICINE | Facility: CLINIC | Age: 55
End: 2025-01-31
Payer: COMMERCIAL

## 2025-01-31 DIAGNOSIS — M47.814 THORACIC SPONDYLOSIS WITHOUT MYELOPATHY: Primary | ICD-10-CM

## 2025-01-31 PROCEDURE — 99214 OFFICE O/P EST MOD 30 MIN: CPT | Performed by: PAIN MEDICINE

## 2025-01-31 PROCEDURE — 99204 OFFICE O/P NEW MOD 45 MIN: CPT | Performed by: PAIN MEDICINE

## 2025-01-31 PROCEDURE — G2211 COMPLEX E/M VISIT ADD ON: HCPCS | Performed by: PAIN MEDICINE

## 2025-01-31 ASSESSMENT — PAIN - FUNCTIONAL ASSESSMENT: PAIN_FUNCTIONAL_ASSESSMENT: 0-10

## 2025-01-31 ASSESSMENT — PAIN SCALES - GENERAL: PAINLEVEL_OUTOF10: 9

## 2025-01-31 NOTE — H&P
History Of Present Illness  Junior Parmar is a 54 y.o. male presenting   Referred here by Dr Pizarro  for chronic back pain. Has seen pain management in the past.  Has had MRI. 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 Has tried prednisone pack that did not help. Methocarbamol did not help.Has had PT and is currently going to a chiropractor.Continues to be on Celebrex.   Pain gets worse as the day goes on, sometimes wakes  him up at night.  Would like to discuss injections.   Daily his the pain at the level of 5 out of 10 then the pain increases over the day with activities and there is days where he wakes up in significant pain.  Denies  any alleviating factors for his condition.  Oswestry disability questionnaire was filled today and graded at 22     Past Medical History  Past Medical History:   Diagnosis Date    Allergic     Allergic contact dermatitis due to plants, except food 09/08/2015    Poison ivy    GERD (gastroesophageal reflux disease)     Other conditions influencing health status     No significant past medical history     Surgical History  Past Surgical History:   Procedure Laterality Date    HERNIA REPAIR      WISDOM TOOTH EXTRACTION       Social History  He reports that he has never smoked. He has never used smokeless tobacco. He reports that he does not drink alcohol and does not use drugs.    Family History  Family History   Problem Relation Name Age of Onset    No Known Problems Mother      Heart disease Father Sr     Hypertension Father Sr     Diabetes Father Sr     Hyperlipidemia Father Sr     Hyperlipidemia Paternal Grandfather          Allergies  No Known Allergies  Review of Systems   12 Systems have been reviewed as follows.   Constitutional: Fever, weight gain, weight loss, appetite change, night sweats, fatigue, chills.  Eyes : blurry, double vision, vision, loss, tearing, redness, pain, sensitivity to light, glaucoma.  Ears, nose, mouth, and throat: Hearing loss, ringing in the  ears, ear pain, nasal congestion, nasal drainage, nosebleeds, mouth, throat, irritation tooth problem.  Cardiovascular :chest pain, pressure, heart tracing,palpaitations , sweating, leg swelling, high or low blood pressure  Pulmonary: Cough, yellow or green sputum, blood and sputum, shortness of breath, wheezing  Gastrointestinal: Nause, vomiting, diarrhea, constipation, pain, blood in stool, or vomitus, heartburn, difficulty swallowing  Genitourinary: incontinence, abnormal bleeding, abnormal discharge, urinary frequency, urinary hesitancy, pain, impotence sexual problem, infection, urinary retention  Musculoskeletal: Pain, stiffness, joint, redness or warmth, arthritis, back pain, weakness, muscle wasting, sprain or fracture  Neuro: Weight weakness, dizziness, change in voice, change in taste change in vision, change in hearing, loss, or change of sensation, trouble walking, balance problems coordination problems, shaking, speech problem  Endocrine , cold or heat intolerance, blood sugar problem, weight gain or loss missed periods hot flashes, sweats, change in body hair, change in libido, increased thirst, increased urination  Heme/lymph: Swelling, bleeding, problem anemia, bruising, enlarged lymph nodes  Allergic/immunologic: H. plus nasal drip, watery itchy eyes, nasal drainage, immunosuppressed  The above, were reviewed and noted negative except as noted.    Physical Exam   Vital signs reviewed, documented in chart     General:  Appears well, does not look in any major distress  Alert    HEENT:  Head atraumatic  Eyes normal inspection  PERRL  Normal ENT inspection  No signs of dehydration    NECK:  Normal inspection  Range of motion within normal     RESPIRATORY:  No respiratory distress    CVS:  Heart rate and rhythm regular    ABDOMEN/GI  Soft  Non-tender  No distention  No organomegaly      BACK:  Normal inspection, flexion and extension within normal limit   Lateral tenderness upon the palpation of the  facet joint around [id number] area  Si joints none tender to palpations     EXTREMITIES:  Non-Tender  Full ROM  Normal appearance  No Pedal edema  Power symmetrical , sensory examination preserved.    NEURO:  Alert and oriented X 3  CNS normal as tested without focal neurological deficit   Sensation normal  Motor normal  reflexes normal    PSYCH:  Mood normal  Affect normal    SKIN:  Color normal  No rash  Warm  Dry  no sign of skin marking supportive of IV drug usage /abuse.    Last Recorded Vitals  There were no vitals taken for this visit.  XR thoracic spine 3 views  Status: Final result     PACS Images     Show images for XR thoracic spine 3 views  Signed by    Signed Time Phone Pager   Rupesh Baker MD 10/12/2023 12:06 [phone]     Exam Information    Status Exam Begun Exam Ended   Final 10/10/2023 14:56 10/10/2023 15:03     Study Result    Narrative & Impression   Interpreted By:  Rupesh Baker,   STUDY:  XR THORACIC SPINE 3 VIEWS      INDICATION:  Signs/Symptoms:pain.      COMPARISON:  None      ACCESSION NUMBER(S):  [id number]      ORDERING CLINICIAN:  KAREN LARA      FINDINGS:  Mild-to-moderate multilevel midthoracic discogenic degenerative  disease.      Alignment normal. No evidence of fracture.      IMPRESSION:  Mild-to-moderate multilevel midthoracic discogenic degenerative  disease.      Signed by: Rupesh Baker 10/12/2023 12:06 PM  Dictation workstation:   [id number]       Assessment/Plan   54 years old with history and physical examination supportive of thoracic spondylosis tried and failed conservative management with physical therapy and nonsteroidal anti-inflammatory    Plan  Advised the patient about the different modalities available for treatment of his condition I recommended obtaining a updated x-ray of the thoracic spine area and I would recommend a diagnostic medial nerve branch block targeting the T10-T11 T11-T12 medial nerve branches bilaterally under fluoroscopic guidance to  confirm needle placement if the patient described positive response at that time could proceed with the denervation with a radiofrequency ablation of the medial nerve branches bilaterally at the level of the T10-T11 T11-T12 under fluoroscopic guidance benefits and risk were discussed with the patient and he would like to proceed      The above clinical summary has been dictated with voice recognition software. It has not been proofread for grammatical errors, typographical mistakes, or other semantic inconsistencies.    Thank you for visiting our office today. It was our pleasure to take part in your healthcare.     Please do not hesitate to contact the pain clinic after your visit with any questions or concerns at  M-F 8-4 pm       Amrit Erwin M.D.  Medical Director , Division of Pain Medicine [facility]   of Anesthesiology and Pain Medicine  [facility] [place] [address]     Office: [phone]  Fax: [phone]      Amrit Erwin MD

## 2025-01-31 NOTE — PROGRESS NOTES
Referred here by Dr Pizarro  for chronic back pain. Has seen pain management in the past.  Has had MRI. 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 Has tried prednisone pack that did not help. Methocarbamol did not help.Has had PT and is currently doing with a chiropractor.  Pain gets worse as the day goes on, sometimes wakes  him up at night.  Would like to discuss injections.

## 2025-03-04 ENCOUNTER — HOSPITAL ENCOUNTER (OUTPATIENT)
Dept: PAIN MEDICINE | Facility: CLINIC | Age: 55
Discharge: HOME | End: 2025-03-04
Payer: COMMERCIAL

## 2025-03-04 VITALS
TEMPERATURE: 96.3 F | OXYGEN SATURATION: 99 % | DIASTOLIC BLOOD PRESSURE: 87 MMHG | RESPIRATION RATE: 20 BRPM | SYSTOLIC BLOOD PRESSURE: 149 MMHG | HEART RATE: 74 BPM

## 2025-03-04 DIAGNOSIS — M47.814 THORACIC SPONDYLOSIS WITHOUT MYELOPATHY: ICD-10-CM

## 2025-03-04 PROCEDURE — 64491 INJ PARAVERT F JNT C/T 2 LEV: CPT | Mod: 50 | Performed by: PAIN MEDICINE

## 2025-03-04 PROCEDURE — 64491 INJ PARAVERT F JNT C/T 2 LEV: CPT | Performed by: PAIN MEDICINE

## 2025-03-04 PROCEDURE — 2500000004 HC RX 250 GENERAL PHARMACY W/ HCPCS (ALT 636 FOR OP/ED): Performed by: PAIN MEDICINE

## 2025-03-04 PROCEDURE — 64492 INJ PARAVERT F JNT C/T 3 LEV: CPT | Mod: 50 | Performed by: PAIN MEDICINE

## 2025-03-04 PROCEDURE — 64490 INJ PARAVERT F JNT C/T 1 LEV: CPT | Performed by: PAIN MEDICINE

## 2025-03-04 RX ORDER — LIDOCAINE HYDROCHLORIDE 10 MG/ML
INJECTION, SOLUTION EPIDURAL; INFILTRATION; INTRACAUDAL; PERINEURAL AS NEEDED
Status: DISCONTINUED | OUTPATIENT
Start: 2025-03-04 | End: 2025-03-05 | Stop reason: HOSPADM

## 2025-03-04 RX ORDER — LIDOCAINE HYDROCHLORIDE 20 MG/ML
INJECTION, SOLUTION EPIDURAL; INFILTRATION; INTRACAUDAL; PERINEURAL AS NEEDED
Status: DISCONTINUED | OUTPATIENT
Start: 2025-03-04 | End: 2025-03-05 | Stop reason: HOSPADM

## 2025-03-04 RX ADMIN — LIDOCAINE HYDROCHLORIDE 10 ML: 10 INJECTION, SOLUTION EPIDURAL; INFILTRATION; INTRACAUDAL; PERINEURAL at 13:45

## 2025-03-04 RX ADMIN — LIDOCAINE HYDROCHLORIDE 10 ML: 20 INJECTION, SOLUTION EPIDURAL; INFILTRATION; INTRACAUDAL at 13:45

## 2025-03-04 ASSESSMENT — PAIN SCALES - GENERAL
PAINLEVEL_OUTOF10: 0 - NO PAIN
PAINLEVEL_OUTOF10: 4

## 2025-03-04 ASSESSMENT — PAIN DESCRIPTION - DESCRIPTORS: DESCRIPTORS: ACHING;DULL

## 2025-03-04 ASSESSMENT — PAIN - FUNCTIONAL ASSESSMENT: PAIN_FUNCTIONAL_ASSESSMENT: 0-10

## 2025-03-04 NOTE — DISCHARGE INSTRUCTIONS
Post-injection instructions FOR FACET BLOCK      Pay attention to how much pain relief (what percentage compared to before the procedure) you get and for how long it lasts.     THIS IS A TEMPORARY NUMBING OF PAIN THIS IS BEING USED AS A DIAGNOSTIC INDICATOR IF THIS IS THE SOURCE OF YOUR PAIN    Activity:  RETURN TO NORMAL ACTIVITY  SEE IF YOU CAN APPRECIATE AN IMPROVEMENT IN THE PAIN    Bandages: Remove after 24 hours     Showering/Bathing: You may shower after bandage is removed     Follow up: CALL OFFICE NEXT BUSINESS [phone] LEAVE MESSAGE ABOUT THE % OF RELIEF THAT WAS OBTAINED AND FOR HOW MANY HOURS      Call the OFFICE immediately: if you notice:     Excessive bleeding from procedure site (brisk bright red bleeding from the site or bleeding that soaks the bandages or does not stop)   Severe headache  Inability to walk, leg or arm weakness or numbness that is worse after the procedure   Uncontrolled pain   New urinary or fecal incontinence   Signs of infection: Fever above 101.5F, redness, swelling, pus or drainage from the site

## 2025-03-06 ENCOUNTER — TELEPHONE (OUTPATIENT)
Dept: PAIN MEDICINE | Facility: CLINIC | Age: 55
End: 2025-03-06
Payer: COMMERCIAL

## 2025-03-11 ENCOUNTER — OFFICE VISIT (OUTPATIENT)
Dept: PAIN MEDICINE | Facility: CLINIC | Age: 55
End: 2025-03-11
Payer: COMMERCIAL

## 2025-03-11 VITALS — TEMPERATURE: 97.3 F | HEART RATE: 72 BPM | DIASTOLIC BLOOD PRESSURE: 81 MMHG | SYSTOLIC BLOOD PRESSURE: 146 MMHG

## 2025-03-11 DIAGNOSIS — M47.814 THORACIC SPONDYLOSIS WITHOUT MYELOPATHY: Primary | ICD-10-CM

## 2025-03-11 PROCEDURE — 99203 OFFICE O/P NEW LOW 30 MIN: CPT | Performed by: NURSE PRACTITIONER

## 2025-03-11 PROCEDURE — 99213 OFFICE O/P EST LOW 20 MIN: CPT | Performed by: NURSE PRACTITIONER

## 2025-03-11 ASSESSMENT — PAIN SCALES - GENERAL: PAINLEVEL_OUTOF10: 3

## 2025-03-11 ASSESSMENT — PAIN - FUNCTIONAL ASSESSMENT: PAIN_FUNCTIONAL_ASSESSMENT: 0-10

## 2025-03-11 ASSESSMENT — PAIN DESCRIPTION - DESCRIPTORS: DESCRIPTORS: ACHING;TINGLING

## 2025-03-11 NOTE — H&P
History Of Present Illness  Junior Parmar is a 54 y.o. male presenting for follow up regarding his chronic back pain. He had a procedure done on 3/4/2025 medial nerve branch block bilateral T10-T11, T11-T12 to help with the back pain. He states today after the procedure he had no pressure, tingling till about in the evening when the discomfort came back. He had a significant improvement in pain relief about 80% after the procedure. Today his level of pain and discomfort is 3/10 mostly in his back described as intermittent aching, tingling sensation. OARRS obtained and reviewed, no abuse or misuse with prescribed medication noted.  He states that the nerves/tingling in his back area thoracic, is intermittent. He was tried .Gabapentin but cannot take it because he is a a [employer] , continues to take Celebrex, Methocarbamol occasionally. No OTC meds such NSAID's, or massage, chiropractor manipulation, TENS unit helps with his pain and discomfort. He states it occurs spontaneously most of the time  He is still able to perform ADL independently, works on regular basis.     Past Medical History  Past Medical History:   Diagnosis Date    Allergic     Allergic contact dermatitis due to plants, except food 09/08/2015    Poison ivy    GERD (gastroesophageal reflux disease)     Other conditions influencing health status     No significant past medical history       Surgical History  Past Surgical History:   Procedure Laterality Date    HERNIA REPAIR      WISDOM TOOTH EXTRACTION          Social History  He reports that he has never smoked. He has never used smokeless tobacco. He reports that he does not drink alcohol and does not use drugs.    Family History  Family History   Problem Relation Name Age of Onset    No Known Problems Mother      Heart disease Father Sr     Hypertension Father Sr     Diabetes Father Sr     Hyperlipidemia Father Sr     Hyperlipidemia Paternal Grandfather          Allergies  Patient has no known  allergies.    Review of Systems   Review of systems x 10 is negative.   No recent injury or falls reported.   No recent change in medical condition reported.   No recent weakness reported.   Still able to control bowel and bladder function.  Denies any problem with constipation.   Denies fever, cough, shortness of breath recently.   No interval change with medication/health issues reported.  Denies opioids diversion and abuse. Denies overuse of pain medications.     Physical Exam  Awake,alert, no acute distress, appropriate.  Spine is of normal curvature.  Full ROM on all 4 extremities, sensation and motor intact, no vascular compromise.  No pedal edema, normal gait.  Skin warm, dry, intact, turgor is normal.  Denies any numbness, tingling.  Tingling on the left side of the back occurs spontaneously.     Last Recorded Vitals  Blood pressure 146/81, pulse 72, temperature 36.3 °C (97.3 °F).    Relevant Results  No recent imaging noted.     Assessment/Plan     54 years old with history and physical examination supportive of thoracic spondylosis tried and failed conservative management with physical therapy and nonsteroidal anti-inflammatory     Discussed about repeating the procedure of diagnostic facet.  He will be scheduled for medial nerve branch block bilateral T10-T11, T11-T12 under fluoroscopic guidance to assist with the pain.  Denies taking any blood thinner.  Follow up in 3 months time or as needed basis  Explained plan to this patient, and patient verbalized understanding and agreement with the plan. If there is questions or concerns, please feel free to contact me to clarify at [phone], M-F 8-4 PM.       I spent 40 minutes in the professional and overall care of this patient.      Gabriela Treviño, APRN-CNP

## 2025-04-01 ENCOUNTER — HOSPITAL ENCOUNTER (OUTPATIENT)
Dept: PAIN MEDICINE | Facility: CLINIC | Age: 55
Discharge: HOME | End: 2025-04-01
Payer: COMMERCIAL

## 2025-04-01 VITALS
TEMPERATURE: 95.7 F | OXYGEN SATURATION: 98 % | RESPIRATION RATE: 18 BRPM | SYSTOLIC BLOOD PRESSURE: 140 MMHG | HEART RATE: 66 BPM | DIASTOLIC BLOOD PRESSURE: 83 MMHG

## 2025-04-01 DIAGNOSIS — M47.814 THORACIC SPONDYLOSIS WITHOUT MYELOPATHY: ICD-10-CM

## 2025-04-01 PROCEDURE — 64493 INJ PARAVERT F JNT L/S 1 LEV: CPT | Mod: 50 | Performed by: PAIN MEDICINE

## 2025-04-01 PROCEDURE — 2500000004 HC RX 250 GENERAL PHARMACY W/ HCPCS (ALT 636 FOR OP/ED): Performed by: PAIN MEDICINE

## 2025-04-01 PROCEDURE — 64494 INJ PARAVERT F JNT L/S 2 LEV: CPT | Mod: 50 | Performed by: PAIN MEDICINE

## 2025-04-01 PROCEDURE — 64494 INJ PARAVERT F JNT L/S 2 LEV: CPT | Performed by: PAIN MEDICINE

## 2025-04-01 PROCEDURE — 64493 INJ PARAVERT F JNT L/S 1 LEV: CPT | Performed by: PAIN MEDICINE

## 2025-04-01 RX ORDER — BUPIVACAINE HYDROCHLORIDE 5 MG/ML
INJECTION, SOLUTION EPIDURAL; INTRACAUDAL; PERINEURAL AS NEEDED
Status: COMPLETED | OUTPATIENT
Start: 2025-04-01 | End: 2025-04-01

## 2025-04-01 RX ORDER — LIDOCAINE HYDROCHLORIDE 10 MG/ML
INJECTION, SOLUTION EPIDURAL; INFILTRATION; INTRACAUDAL; PERINEURAL AS NEEDED
Status: COMPLETED | OUTPATIENT
Start: 2025-04-01 | End: 2025-04-01

## 2025-04-01 RX ADMIN — BUPIVACAINE HYDROCHLORIDE 10 ML: 5 INJECTION, SOLUTION EPIDURAL; INTRACAUDAL; PERINEURAL at 10:56

## 2025-04-01 RX ADMIN — LIDOCAINE HYDROCHLORIDE 10 ML: 10 INJECTION, SOLUTION EPIDURAL; INFILTRATION; INTRACAUDAL; PERINEURAL at 10:56

## 2025-04-01 ASSESSMENT — COLUMBIA-SUICIDE SEVERITY RATING SCALE - C-SSRS
6. HAVE YOU EVER DONE ANYTHING, STARTED TO DO ANYTHING, OR PREPARED TO DO ANYTHING TO END YOUR LIFE?: NO
1. IN THE PAST MONTH, HAVE YOU WISHED YOU WERE DEAD OR WISHED YOU COULD GO TO SLEEP AND NOT WAKE UP?: NO
2. HAVE YOU ACTUALLY HAD ANY THOUGHTS OF KILLING YOURSELF?: NO

## 2025-04-01 ASSESSMENT — PAIN - FUNCTIONAL ASSESSMENT: PAIN_FUNCTIONAL_ASSESSMENT: 0-10

## 2025-04-01 ASSESSMENT — PAIN SCALES - GENERAL: PAINLEVEL_OUTOF10: 3

## 2025-04-01 ASSESSMENT — PAIN DESCRIPTION - DESCRIPTORS: DESCRIPTORS: ACHING

## 2025-04-01 NOTE — DISCHARGE INSTRUCTIONS
Post-injection instructions FOR FACET BLOCK      Pay attention to how much pain relief (what percentage compared to before the procedure) you get and for how long it lasts.     THIS IS A TEMPORARY NUMBING OF PAIN THIS IS BEING USED AS A DIAGNOSTIC INDICATOR IF THIS IS THE SOURCE OF YOUR PAIN    Activity:  RETURN TO NORMAL ACTIVITY  SEE IF YOU CAN APPRECIATE AN IMPROVEMENT IN THE PAIN    Bandages: Remove after 24 hours     Showering/Bathing: You may shower after bandage is removed     Follow up: CALL OFFICE NEXT BUSINESS [phone] LEAVE MESSAGE ABOUT THE % OF RELIEF THAT WAS OBTAINED AND FOR HOW MANY HOURS      Call the OFFICE immediately: if you notice:     Excessive bleeding from procedure site (brisk bright red bleeding from the site or bleeding that soaks the bandages or does not stop)   Severe headache  Inability to walk, leg or arm weakness or numbness that is worse after the procedure   Uncontrolled pain   New urinary or fecal incontinence   Signs of infection: Fever above 101.5F, redness, swelling, pus or drainage from the site

## 2025-04-02 ENCOUNTER — TELEPHONE (OUTPATIENT)
Dept: PAIN MEDICINE | Facility: CLINIC | Age: 55
End: 2025-04-02
Payer: COMMERCIAL

## 2025-05-13 ENCOUNTER — HOSPITAL ENCOUNTER (OUTPATIENT)
Dept: PAIN MEDICINE | Facility: CLINIC | Age: 55
Discharge: HOME | End: 2025-05-13
Payer: COMMERCIAL

## 2025-05-13 VITALS
SYSTOLIC BLOOD PRESSURE: 100 MMHG | OXYGEN SATURATION: 96 % | HEART RATE: 65 BPM | RESPIRATION RATE: 16 BRPM | DIASTOLIC BLOOD PRESSURE: 55 MMHG

## 2025-05-13 DIAGNOSIS — M47.814 THORACIC SPONDYLOSIS WITHOUT MYELOPATHY: ICD-10-CM

## 2025-05-13 PROCEDURE — 3700000012 HC SEDATION LEVEL 5+ TIME - INITIAL 15 MINUTES 5/> YEARS

## 2025-05-13 PROCEDURE — 2500000004 HC RX 250 GENERAL PHARMACY W/ HCPCS (ALT 636 FOR OP/ED): Performed by: PAIN MEDICINE

## 2025-05-13 PROCEDURE — 99152 MOD SED SAME PHYS/QHP 5/>YRS: CPT | Performed by: PAIN MEDICINE

## 2025-05-13 PROCEDURE — 64636 DESTROY L/S FACET JNT ADDL: CPT | Performed by: PAIN MEDICINE

## 2025-05-13 PROCEDURE — 64635 DESTROY LUMB/SAC FACET JNT: CPT | Mod: 50 | Performed by: PAIN MEDICINE

## 2025-05-13 PROCEDURE — 64635 DESTROY LUMB/SAC FACET JNT: CPT | Performed by: PAIN MEDICINE

## 2025-05-13 PROCEDURE — 2720000007 HC OR 272 NO HCPCS

## 2025-05-13 PROCEDURE — 64636 DESTROY L/S FACET JNT ADDL: CPT | Mod: 50 | Performed by: PAIN MEDICINE

## 2025-05-13 RX ORDER — MIDAZOLAM HYDROCHLORIDE 1 MG/ML
INJECTION, SOLUTION INTRAMUSCULAR; INTRAVENOUS AS NEEDED
Status: COMPLETED | OUTPATIENT
Start: 2025-05-13 | End: 2025-05-13

## 2025-05-13 RX ORDER — LIDOCAINE HYDROCHLORIDE 10 MG/ML
INJECTION, SOLUTION EPIDURAL; INFILTRATION; INTRACAUDAL; PERINEURAL AS NEEDED
Status: COMPLETED | OUTPATIENT
Start: 2025-05-13 | End: 2025-05-13

## 2025-05-13 RX ADMIN — MIDAZOLAM 2 MG: 1 INJECTION INTRAMUSCULAR; INTRAVENOUS at 08:09

## 2025-05-13 RX ADMIN — LIDOCAINE HYDROCHLORIDE 10 ML: 10 INJECTION, SOLUTION EPIDURAL; INFILTRATION; INTRACAUDAL; PERINEURAL at 08:10

## 2025-05-13 ASSESSMENT — PAIN - FUNCTIONAL ASSESSMENT: PAIN_FUNCTIONAL_ASSESSMENT: 0-10

## 2025-05-13 ASSESSMENT — PAIN DESCRIPTION - DESCRIPTORS: DESCRIPTORS: ACHING;DULL

## 2025-05-13 ASSESSMENT — PAIN SCALES - GENERAL: PAINLEVEL_OUTOF10: 3

## 2025-05-13 NOTE — H&P
History Of Present Illness  Junior Parmar is a 54 y.o. male presenting with thoracic pain      Past Medical History  Medical History[1]  Surgical History  Surgical History[2]  Social History  He reports that he has never smoked. He has never used smokeless tobacco. He reports that he does not drink alcohol and does not use drugs.    Family History  Family History[3]     Allergies  Allergies[4]  Review of Systems   All 13 systems were reviewed and are within normal levels except as noted below or per HPI. Positive and pertinent negative responses are noted below or in the HPI   Denied any fever or chills. No weight loss and no night sweats. No cough or sputum production. No diarrhea   No constipation  No bladder and bowel incontinence and no other changes in bladder and bowel. No skin changes.   Denied opioids diversion and abuse and denies alcoholism. Denies overuse of  pain medications.    Physical Exam       Past medical history no interval changes has been noted    On physical examination    General   Alert, oriented x3 pleasant and cooperative. Does not look in any major distress.    HEENT  Pupils normal in size. Ears, nose, mouth, and throat appear to be in normal condition.  Head atraumatic      No signs of sedation or signs of withdrawal apparent.    Psychiatric   No signs of depression apparent.    Neuro   No focal neurological deficit apparent. Ambulation at baseline.      Respiratory  No respiratory distress     Abdomen  no distention     Skin  No skin markings supportive of recent IV drug usage .    Cardiovascular  Regular rate and rhythm     Last Recorded Vitals  Blood pressure 155/63, pulse 70, resp. rate 18, SpO2 98%.    Assessment/Plan   Here for RF thoracic      Amrit Erwin MD       [1]   Past Medical History:  Diagnosis Date    Allergic     Allergic contact dermatitis due to plants, except food 09/08/2015    Poison ivy    GERD (gastroesophageal reflux disease)     Other conditions  influencing health status     No significant past medical history   [2]   Past Surgical History:  Procedure Laterality Date    HERNIA REPAIR      WISDOM TOOTH EXTRACTION     [3]   Family History  Problem Relation Name Age of Onset    No Known Problems Mother      Heart disease Father Sr     Hypertension Father Sr     Diabetes Father Sr     Hyperlipidemia Father Sr     Hyperlipidemia Paternal Grandfather     [4] No Known Allergies

## 2025-05-13 NOTE — DISCHARGE INSTRUCTIONS
Post-injection instructions FOR Radiofrequency Ablation    Your radiofrequency ablation was completed today is not unusual to have some exacerbation of the pain before you get better.  Radiofrequency ablation takes an average of 2 to 3 weeks before it completely starts showing full results      Activity:  RETURN TO NORMAL ACTIVITY once the sedation is completely worn off do not sign any legal document for the first 24 hours do not drive or operate machinery for the first 24 hours until the sedation effect is completely worn off    Bandages: Remove after 24 hours     Showering/Bathing: You may shower after bandage is removed     May use ice at the site of the injection for the first 24 hours      Call the OFFICE immediately: if you notice:     Excessive bleeding from procedure site (brisk bright red bleeding from the site or bleeding that soaks the bandages or does not stop)   Severe headache  Inability to walk, leg or arm weakness or numbness that is worse after the procedure   Uncontrolled pain   New urinary or fecal incontinence   Signs of infection: Fever above 101.5F, redness, swelling, pus or drainage from the site    Please contact the pain clinic at [phone]  in 3 weeks to report results or with any further questions or concerns

## 2025-05-26 DIAGNOSIS — E78.2 MIXED HYPERLIPIDEMIA: ICD-10-CM

## 2025-05-27 RX ORDER — ROSUVASTATIN CALCIUM 5 MG/1
5 TABLET, COATED ORAL DAILY
Qty: 90 TABLET | Refills: 3 | Status: SHIPPED | OUTPATIENT
Start: 2025-05-27

## 2025-06-11 DIAGNOSIS — Z12.12 SCREENING FOR COLORECTAL CANCER: ICD-10-CM

## 2025-06-11 DIAGNOSIS — Z12.11 SCREENING FOR COLORECTAL CANCER: ICD-10-CM

## 2025-06-19 ENCOUNTER — OFFICE VISIT (OUTPATIENT)
Dept: PAIN MEDICINE | Facility: CLINIC | Age: 55
End: 2025-06-19
Payer: COMMERCIAL

## 2025-06-19 VITALS
OXYGEN SATURATION: 99 % | SYSTOLIC BLOOD PRESSURE: 162 MMHG | DIASTOLIC BLOOD PRESSURE: 91 MMHG | HEART RATE: 64 BPM | TEMPERATURE: 95.7 F | RESPIRATION RATE: 20 BRPM

## 2025-06-19 DIAGNOSIS — M47.814 THORACIC SPONDYLOSIS: Primary | ICD-10-CM

## 2025-06-19 DIAGNOSIS — M54.6 CHRONIC MIDLINE THORACIC BACK PAIN: Primary | ICD-10-CM

## 2025-06-19 DIAGNOSIS — M54.6 CHRONIC MIDLINE THORACIC BACK PAIN: ICD-10-CM

## 2025-06-19 DIAGNOSIS — G89.29 CHRONIC MIDLINE THORACIC BACK PAIN: ICD-10-CM

## 2025-06-19 DIAGNOSIS — G89.29 CHRONIC MIDLINE THORACIC BACK PAIN: Primary | ICD-10-CM

## 2025-06-19 PROCEDURE — 1036F TOBACCO NON-USER: CPT | Performed by: NURSE PRACTITIONER

## 2025-06-19 PROCEDURE — 99214 OFFICE O/P EST MOD 30 MIN: CPT | Performed by: NURSE PRACTITIONER

## 2025-06-19 RX ORDER — CELECOXIB 200 MG/1
200 CAPSULE ORAL 2 TIMES DAILY
Qty: 60 CAPSULE | Refills: 2 | Status: SHIPPED | OUTPATIENT
Start: 2025-06-19 | End: 2025-09-17

## 2025-06-19 ASSESSMENT — PAIN SCALES - GENERAL: PAINLEVEL_OUTOF10: 2

## 2025-06-19 ASSESSMENT — PAIN DESCRIPTION - DESCRIPTORS: DESCRIPTORS: BURNING

## 2025-06-19 ASSESSMENT — COLUMBIA-SUICIDE SEVERITY RATING SCALE - C-SSRS
6. HAVE YOU EVER DONE ANYTHING, STARTED TO DO ANYTHING, OR PREPARED TO DO ANYTHING TO END YOUR LIFE?: NO
2. HAVE YOU ACTUALLY HAD ANY THOUGHTS OF KILLING YOURSELF?: NO
1. IN THE PAST MONTH, HAVE YOU WISHED YOU WERE DEAD OR WISHED YOU COULD GO TO SLEEP AND NOT WAKE UP?: NO

## 2025-06-19 ASSESSMENT — ENCOUNTER SYMPTOMS
OCCASIONAL FEELINGS OF UNSTEADINESS: 0
LOSS OF SENSATION IN FEET: 0

## 2025-06-19 ASSESSMENT — PAIN - FUNCTIONAL ASSESSMENT: PAIN_FUNCTIONAL_ASSESSMENT: 0-10

## 2025-06-19 NOTE — H&P
History Of Present Illness  Junior Parmar is a 55 y.o. male presenting for follow up after the procedure.  He is known in this clinic because of chronic back pain. He recently had a Radiofrequency ablation bilateral T10-T11, T11-T12 done on 5/13/2025. He states after the procedure he had relief for about 5 to 6 days, but after that the tingling came back. Today he states he is having intermittent tingling in his middle back.   Today his level of pain is about 2/10 mostly in his middle back described as intermittent burning sensation. He continues to take Celebrex for pain .  OARRS obtained and reviewed, no abuse or misuse with prescribed medication noted.  He is a [employer]  .  Past Medical History  Medical History[1]    Surgical History  Surgical History[2]     Social History  He reports that he has never smoked. He has never used smokeless tobacco. He reports that he does not drink alcohol and does not use drugs.    Family History  Family History[3]     Allergies  Patient has no known allergies.    Review of Systems   Review of systems x 10 is negative.   No recent injury or falls reported.   No recent change in medical condition reported.   No recent weakness reported.   Still able to control bowel and bladder function.  Denies any problem with constipation.   Denies fever, cough, shortness of breath recently.   No interval change with medication/health issues reported.  Denies opioids diversion and abuse. Denies overuse of pain medications.     Physical Exam   Awake,alert, no acute distress, appropriate.  Spine is of normal curvature.  Full ROM on all 4 extremities, sensation and motor intact, no vascular compromise.  No pedal edema, normal gait.  Skin warm, dry, intact, turgor is normal.  He states of tingling in his middle back.    Last Recorded Vitals  Blood pressure (!) 162/91, pulse 64, temperature 35.4 °C (95.7 °F), temperature source Temporal, resp. rate 20, SpO2 99%.    Relevant Results  No recent  imaging noted.       55 years old with history and physical examination supportive of thoracic spondylosis tried and failed conservative management with physical therapy and nonsteroidal anti-inflammatory .    Discussed about starting him on Gabapentin at a low dose trial to help with the tingling. He will check if its in the Ban list from the [employer] list of medication.  Informed him that if it is in the ban lists, then will schedule him for an epidural steroid injection T11-T12, to help with the tingling of the back.  He will call me back to let me know . He is not on any blood thinner.  Continue Celebrex as prescribed.  Follow up in 3 months time or as needed basis  Explained plan to this patient, and patient verbalized understanding and agreement with the plan. If there is questions or concerns, please feel free to contact me to clarify at [phone], M-F 8-4 PM.    I spent 33 minutes in the professional and overall care of this patient.      Gabriela Treviño, APRN-CNP         [1]   Past Medical History:  Diagnosis Date    Allergic     Allergic contact dermatitis due to plants, except food 09/08/2015    Poison ivy    GERD (gastroesophageal reflux disease)     Other conditions influencing health status     No significant past medical history   [2]   Past Surgical History:  Procedure Laterality Date    HERNIA REPAIR      WISDOM TOOTH EXTRACTION     [3]   Family History  Problem Relation Name Age of Onset    No Known Problems Mother      Heart disease Father Sr     Hypertension Father Sr     Diabetes Father Sr     Hyperlipidemia Father Sr     Hyperlipidemia Paternal Grandfather

## 2025-06-26 RX ORDER — GABAPENTIN 100 MG/1
100 CAPSULE ORAL 3 TIMES DAILY
Qty: 90 CAPSULE | Refills: 0 | Status: SHIPPED | OUTPATIENT
Start: 2025-06-26 | End: 2025-07-26

## 2025-07-06 LAB — NONINV COLON CA DNA+OCC BLD SCRN STL QL: NEGATIVE

## 2025-07-14 DIAGNOSIS — M54.6 CHRONIC MIDLINE THORACIC BACK PAIN: ICD-10-CM

## 2025-07-14 DIAGNOSIS — G89.29 CHRONIC MIDLINE THORACIC BACK PAIN: ICD-10-CM

## 2025-07-14 RX ORDER — GABAPENTIN 100 MG/1
100 CAPSULE ORAL 3 TIMES DAILY
Qty: 90 CAPSULE | Refills: 2 | Status: SHIPPED | OUTPATIENT
Start: 2025-07-14 | End: 2025-08-13

## 2025-08-12 ENCOUNTER — OFFICE VISIT (OUTPATIENT)
Dept: PAIN MEDICINE | Facility: CLINIC | Age: 55
End: 2025-08-12
Payer: COMMERCIAL

## 2025-08-12 VITALS — HEART RATE: 60 BPM | DIASTOLIC BLOOD PRESSURE: 82 MMHG | SYSTOLIC BLOOD PRESSURE: 137 MMHG

## 2025-08-12 DIAGNOSIS — M54.14 THORACIC RADICULOPATHY: Primary | ICD-10-CM

## 2025-08-12 PROCEDURE — 99214 OFFICE O/P EST MOD 30 MIN: CPT | Performed by: NURSE PRACTITIONER

## 2025-08-12 PROCEDURE — 99212 OFFICE O/P EST SF 10 MIN: CPT

## 2025-08-12 ASSESSMENT — PAIN DESCRIPTION - DESCRIPTORS: DESCRIPTORS: ACHING;SHARP

## 2025-08-12 ASSESSMENT — PAIN - FUNCTIONAL ASSESSMENT: PAIN_FUNCTIONAL_ASSESSMENT: 0-10

## 2025-08-12 ASSESSMENT — PAIN SCALES - GENERAL: PAINLEVEL_OUTOF10: 5 - MODERATE PAIN

## 2025-09-04 ENCOUNTER — HOSPITAL ENCOUNTER (OUTPATIENT)
Dept: PAIN MEDICINE | Facility: CLINIC | Age: 55
Discharge: HOME | End: 2025-09-04
Payer: COMMERCIAL

## 2025-09-04 VITALS
SYSTOLIC BLOOD PRESSURE: 137 MMHG | RESPIRATION RATE: 20 BRPM | TEMPERATURE: 96.9 F | HEART RATE: 71 BPM | DIASTOLIC BLOOD PRESSURE: 74 MMHG | OXYGEN SATURATION: 97 %

## 2025-09-04 DIAGNOSIS — M54.14 THORACIC RADICULOPATHY: ICD-10-CM

## 2025-09-04 PROCEDURE — 62321 NJX INTERLAMINAR CRV/THRC: CPT | Performed by: PAIN MEDICINE

## 2025-09-04 PROCEDURE — 2550000001 HC RX 255 CONTRASTS: Mod: JW | Performed by: PAIN MEDICINE

## 2025-09-04 PROCEDURE — 7100000010 HC PHASE TWO TIME - EACH INCREMENTAL 1 MINUTE

## 2025-09-04 PROCEDURE — 7100000009 HC PHASE TWO TIME - INITIAL BASE CHARGE

## 2025-09-04 PROCEDURE — 2500000004 HC RX 250 GENERAL PHARMACY W/ HCPCS (ALT 636 FOR OP/ED): Performed by: PAIN MEDICINE

## 2025-09-04 RX ORDER — METHYLPREDNISOLONE ACETATE 80 MG/ML
INJECTION, SUSPENSION INTRA-ARTICULAR; INTRALESIONAL; INTRAMUSCULAR; SOFT TISSUE AS NEEDED
Status: DISCONTINUED | OUTPATIENT
Start: 2025-09-04 | End: 2025-09-05 | Stop reason: HOSPADM

## 2025-09-04 RX ORDER — LIDOCAINE HYDROCHLORIDE 10 MG/ML
INJECTION, SOLUTION EPIDURAL; INFILTRATION; INTRACAUDAL; PERINEURAL AS NEEDED
Status: DISCONTINUED | OUTPATIENT
Start: 2025-09-04 | End: 2025-09-05 | Stop reason: HOSPADM

## 2025-09-04 RX ORDER — BUPIVACAINE HYDROCHLORIDE 2.5 MG/ML
INJECTION, SOLUTION EPIDURAL; INFILTRATION; INTRACAUDAL; PERINEURAL AS NEEDED
Status: DISCONTINUED | OUTPATIENT
Start: 2025-09-04 | End: 2025-09-05 | Stop reason: HOSPADM

## 2025-09-04 RX ADMIN — BUPIVACAINE HYDROCHLORIDE 5 ML: 2.5 INJECTION, SOLUTION EPIDURAL; INFILTRATION; INTRACAUDAL; PERINEURAL at 08:35

## 2025-09-04 RX ADMIN — METHYLPREDNISOLONE ACETATE 80 MG: 80 INJECTION, SUSPENSION INTRA-ARTICULAR; INTRALESIONAL; INTRAMUSCULAR; SOFT TISSUE at 08:35

## 2025-09-04 RX ADMIN — LIDOCAINE HYDROCHLORIDE 3 ML: 10 INJECTION, SOLUTION EPIDURAL; INFILTRATION; INTRACAUDAL; PERINEURAL at 08:33

## 2025-09-04 RX ADMIN — IOHEXOL 3 ML: 300 INJECTION, SOLUTION INTRAVENOUS at 08:35

## 2025-09-04 ASSESSMENT — PAIN SCALES - GENERAL: PAINLEVEL_OUTOF10: 4

## 2025-09-04 ASSESSMENT — PAIN DESCRIPTION - DESCRIPTORS: DESCRIPTORS: ACHING;DULL;DISCOMFORT

## 2025-09-04 ASSESSMENT — PAIN - FUNCTIONAL ASSESSMENT: PAIN_FUNCTIONAL_ASSESSMENT: 0-10
